# Patient Record
Sex: FEMALE | Employment: UNEMPLOYED | URBAN - METROPOLITAN AREA
[De-identification: names, ages, dates, MRNs, and addresses within clinical notes are randomized per-mention and may not be internally consistent; named-entity substitution may affect disease eponyms.]

---

## 2017-02-20 ENCOUNTER — HOSPITAL ENCOUNTER (OUTPATIENT)
Dept: PHYSICAL THERAPY | Age: 47
Discharge: HOME | End: 2017-02-20
Payer: COMMERCIAL

## 2017-02-20 PROCEDURE — 97161 PT EVAL LOW COMPLEX 20 MIN: CPT

## 2017-02-20 PROCEDURE — 97110 THERAPEUTIC EXERCISES: CPT

## 2017-02-20 PROCEDURE — 97140 MANUAL THERAPY 1/> REGIONS: CPT
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
## 2017-02-21 ENCOUNTER — APPOINTMENT (OUTPATIENT)
Dept: PHYSICAL THERAPY | Age: 47
End: 2017-02-21
Payer: COMMERCIAL

## 2017-02-22 ENCOUNTER — HOSPITAL ENCOUNTER (OUTPATIENT)
Dept: PHYSICAL THERAPY | Age: 47
Discharge: HOME | End: 2017-02-22
Payer: COMMERCIAL

## 2017-02-22 PROCEDURE — 97110 THERAPEUTIC EXERCISES: CPT

## 2017-02-22 NOTE — PROGRESS NOTES
PT DAILY TREATMENT NOTE 8-    Patient Name: Danielle Cabello  Date:2017  : 1970  [x]  Patient  Verified  Payor: Prema Gray / Plan: [facility] / Product Type: PPO /    In time:1:00  Out time:2:00  Total Treatment Time (min): 60  Total Timed Codes (min): 50  1:1 Treatment Time (min):    Visit #: 2 of 12    Treatment Area: Cervicalgia [M54.2]    SUBJECTIVE  Pain Level (0-10 scale): 5-6  Any medication changes, allergies to medications, adverse drug reactions, diagnosis change, or new procedure performed?: [x] No    [] Yes (see summary sheet for update)  Subjective functional status/changes:   [] No changes reported  I'm just a little sore with the exercises she gave me on the first appointment, but I think it's just from use.      OBJECTIVE  Modality rationale: decrease pain and increase tissue extensibility to improve the patients ability to change and maintain head and shoulder positions     Min Type Additional Details    [] Estim: []Att   []Unatt        []TENS instruct                  []IFC  []Premod   []NMES                     []Other:  []w/US   []w/ice   []w/heat  Position:  Location:    []  Traction: [] Cervical       []Lumbar                       [] Prone          []Supine                       []Intermittent   []Continuous Lbs:  [] before manual  [] after manual    []  Ultrasound: []Continuous   [] Pulsed                           []1MHz   []3MHz Location:  W/cm2:    []  Iontophoresis with dexamethasone         Location: [] Take home patch   [] In clinic   10 []  Ice     [x]  heat  []  Ice massage Position:  Location: Lumbar and cervical    []  Vasopneumatic Device Pressure:       [] lo [] med [] hi   Temperature: [] lo [] med [] hi   [] Skin assessment post-treatment:  []intact []redness- no adverse reaction       []redness  adverse reaction:       50 min Therapeutic Exercise:  [] See flow sheet :   Rationale: increase ROM and increase strength to improve the patients ability to change and maintain head and shoulder positions; change of body position          Pain Level (0-10 scale) post treatment: 4    ASSESSMENT/Changes in Function: Today's visit was focused on fundamentals of core stabilization, including bracing contraction. Pt found performance of this challenging due to tendency to hold breath, which should improve with further repetition via addition to HEP. Patient will continue to benefit from skilled PT services to modify and progress therapeutic interventions, address strength deficits and analyze and cue movement patterns to attain remaining goals.      []  See Plan of Care  []  See progress note/recertification  []  See Discharge Summary      PLAN  []  Upgrade activities as tolerated     [x]  Continue plan of care  []  Update interventions per flow sheet       []  Discharge due to:_  []  Other:_      Marlyn Babinski, PT 2/22/2017  2:21 PM

## 2017-02-24 ENCOUNTER — HOSPITAL ENCOUNTER (OUTPATIENT)
Dept: PHYSICAL THERAPY | Age: 47
Discharge: HOME | End: 2017-02-24
Payer: COMMERCIAL

## 2017-02-24 PROCEDURE — 97110 THERAPEUTIC EXERCISES: CPT

## 2017-02-24 NOTE — PROGRESS NOTES
PT DAILY TREATMENT NOTE 8    Patient Name: Jenae Holden  Date:2017  : 1970  [x]  Patient  Verified  Payor: BLUE CROSS / Plan: [employer] [facility] [place] / Product Type: PPO /    In time:1129  Out time:1205  Total Treatment Time (min): 36   Visit #: 3 of 12    Treatment Area: Cervicalgia [M54.2]    SUBJECTIVE  Pain Level (0-10 scale):7  Any medication changes, allergies to medications, adverse drug reactions, diagnosis change, or new procedure performed?: [x] No    [] Yes (see summary sheet for update)  Subjective functional status/changes:   [] No changes reported  \"My neck hurts the most today\"    OBJECTIVE  Modality rationale: decrease pain and increase tissue extensibility to improve the patients ability to change and maintain head and shoulder positions     Min Type Additional Details    [] Estim: []Att   []Unatt        []TENS instruct                  []IFC  []Premod   []NMES                     []Other:  []w/US   []w/ice   []w/heat  Position:  Location:    []  Traction: [] Cervical       []Lumbar                       [] Prone          []Supine                       []Intermittent   []Continuous Lbs:  [] before manual  [] after manual    []  Ultrasound: []Continuous   [] Pulsed                           []1MHz   []3MHz Location:  W/cm2:    []  Iontophoresis with dexamethasone         Location: [] Take home patch   [] In clinic   PD []  Ice     [x]  heat  []  Ice massage Position:  Location: Lumbar and cervical    []  Vasopneumatic Device Pressure:       [] lo [] med [] hi   Temperature: [] lo [] med [] hi   [] Skin assessment post-treatment:  []intact []redness- no adverse reaction       []redness  adverse reaction:       36 min Therapeutic Exercise:  [] See flow sheet :   Rationale: increase ROM and increase strength to improve the patients ability to change and maintain head and shoulder positions; change of body position          Pain Level (0-10 scale) post treatment: 5    ASSESSMENT/Changes in Function: Pt required ample cuing for all therex today with moderate focus on not holding her breath with all TA activities. Pt demonstrated moderate decrease in pain following therex today in the C/S with no reported pain in the L/S today. Pt was educated on possibly holding off on fitness program for 1-2 more weeks in order to focus on postural education and advancing PT. Patient will continue to benefit from skilled PT services to modify and progress therapeutic interventions, address strength deficits and analyze and cue movement patterns to attain remaining goals.      []  See Plan of Care  []  See progress note/recertification  []  See Discharge Summary      PLAN  []  Upgrade activities as tolerated     [x]  Continue plan of care  []  Update interventions per flow sheet       []  Discharge due to:_  []  Other:_      Siomara Raines, PT 2/24/2017  2:21 PM

## 2017-02-28 ENCOUNTER — APPOINTMENT (OUTPATIENT)
Dept: PHYSICAL THERAPY | Age: 47
End: 2017-02-28
Payer: COMMERCIAL

## 2017-03-02 ENCOUNTER — HOSPITAL ENCOUNTER (OUTPATIENT)
Dept: PHYSICAL THERAPY | Age: 47
Discharge: HOME | End: 2017-03-02
Payer: COMMERCIAL

## 2017-03-02 PROCEDURE — 97110 THERAPEUTIC EXERCISES: CPT

## 2017-03-02 PROCEDURE — 97530 THERAPEUTIC ACTIVITIES: CPT

## 2017-03-02 NOTE — PROGRESS NOTES
PT DAILY TREATMENT NOTE 8-    Patient Name: Danielle Cabello  Date:3/2/2017  : 1970  [x]  Patient  Verified  Payor: BLUE CROSS / Plan: [address] [place] [address] [place] / Product Type: PPO /    In time:3:30  Out time:4:22  Total Treatment Time (min): 52  Total Timed Codes (min): 42  1:1 Treatment Time (min):    Visit #: 4 of 12    Treatment Area: Cervicalgia [M54.2]    SUBJECTIVE  Pain Level (0-10 scale): 6  Any medication changes, allergies to medications, adverse drug reactions, diagnosis change, or new procedure performed?: [x] No    [] Yes (see summary sheet for update)  Subjective functional status/changes:   [] No changes reported  I think I must be tensing my neck muscles when I shouldn't be while doing some core exercises.     OBJECTIVE  Modality rationale: decrease pain and increase tissue extensibility to improve the patients ability to change and maintain head and shoulder positions     Min Type Additional Details    [] Estim: []Att   []Unatt        []TENS instruct                  []IFC  []Premod   []NMES                     []Other:  []w/US   []w/ice   []w/heat  Position:  Location:    []  Traction: [] Cervical       []Lumbar                       [] Prone          []Supine                       []Intermittent   []Continuous Lbs:  [] before manual  [] after manual    []  Ultrasound: []Continuous   [] Pulsed                           []1MHz   []3MHz Location:  W/cm2:    []  Iontophoresis with dexamethasone         Location: [] Take home patch   [] In clinic   10 []  Ice     [x]  heat  []  Ice massage Position:  Location: C-spine    []  Vasopneumatic Device Pressure:       [] lo [] med [] hi   Temperature: [] lo [] med [] hi   [] Skin assessment post-treatment:  []intact []redness- no adverse reaction       []redness  adverse reaction:       32 min Therapeutic Exercise:  [] See flow sheet :   Rationale: increase ROM and increase strength to improve the patients ability to change and maintain head and shoulder positions      10 min Therapeutic Activity:  []  See flow sheet :   Rationale: Worked with biofeedback and visual cues to decrease scapular elevation and substitution today to decrease neck pain           Pain Level (0-10 scale) post treatment: 2    ASSESSMENT/Changes in Function: Worked extensively on controlling substitutional neck and shoulder movement with exercises today. Pt was encouraged to add     Patient will continue to benefit from skilled PT services to address strength deficits, analyze and address soft tissue restrictions, analyze and cue movement patterns and assess and modify postural abnormalities to attain remaining goals.      []  See Plan of Care  []  See progress note/recertification  []  See Discharge Summary           PLAN  []  Upgrade activities as tolerated     [x]  Continue plan of care  []  Update interventions per flow sheet       []  Discharge due to:_  []  Other:_      William Alejandre, PT 3/2/2017  3:41 PM

## 2017-03-03 ENCOUNTER — HOSPITAL ENCOUNTER (OUTPATIENT)
Dept: PHYSICAL THERAPY | Age: 47
Discharge: HOME | End: 2017-03-03
Payer: COMMERCIAL

## 2017-03-03 PROCEDURE — 97530 THERAPEUTIC ACTIVITIES: CPT

## 2017-03-03 PROCEDURE — 97110 THERAPEUTIC EXERCISES: CPT

## 2017-03-07 ENCOUNTER — HOSPITAL ENCOUNTER (OUTPATIENT)
Dept: PHYSICAL THERAPY | Age: 47
Discharge: HOME | End: 2017-03-07
Payer: COMMERCIAL

## 2017-03-07 PROCEDURE — 97014 ELECTRIC STIMULATION THERAPY: CPT

## 2017-03-07 PROCEDURE — 97110 THERAPEUTIC EXERCISES: CPT

## 2017-03-08 ENCOUNTER — HOSPITAL ENCOUNTER (OUTPATIENT)
Dept: PHYSICAL THERAPY | Age: 47
Discharge: HOME | End: 2017-03-08
Payer: COMMERCIAL

## 2017-03-08 PROCEDURE — 97110 THERAPEUTIC EXERCISES: CPT

## 2017-03-08 NOTE — PROGRESS NOTES
PT DAILY TREATMENT NOTE     Patient Name: Trixie Aguayo  Date:3/8/2017  : 1970  [x]  Patient  Verified  Payor: BLUE CROSS / Plan: [employer] [facility] / Product Type: PPO /    In time:1:30  Out time:2:27  Total Treatment Time (min): 57  Total Timed Codes (min): 57  1:1 Treatment Time (min):    Visit #: 7 of 12    Treatment Area: Cervicalgia [M54.2]    SUBJECTIVE  Pain Level (0-10 scale): cervical=8/10; lumbar=1/10  Any medication changes, allergies to medications, adverse drug reactions, diagnosis change, or new procedure performed?: [x] No    [] Yes (see summary sheet for update)  Subjective functional status/changes:   [] No changes reported  My neck has been hurting more than my lower back today especially when I look up or down, but it's not bad looking side to side. OBJECTIVE      57 min Therapeutic Exercise:  [x] See flow sheet :   Rationale: increase ROM and increase strength to improve the patients ability to improve functional abilities        min Patient Education: [x] Review HEP    [] Progressed/Changed HEP based on:   [] positioning   [] body mechanics   [] transfers   [] heat/ice application        Other Objective/Functional Measures:     Pain Level (0-10 scale) post treatment: 6/10    ASSESSMENT/Changes in Function: Pt presents with chief c/o increased cervical>lumbar pain/sxs with active cervical flexion and extension. Pt is still on schedule to begin supervised fitness program with certified sports performance coordinators at this facility before next tx. Pt was given updated HEP with theraband issued and declined modalities at end of tx today. Will continue to progress/advance patient within current POC as tolerated with monitoring symptoms.      Patient will continue to benefit from skilled PT services to modify and progress therapeutic interventions, address functional mobility deficits, address ROM deficits, address strength deficits, analyze and cue movement patterns, analyze and modify body mechanics/ergonomics and assess and modify postural abnormalities to attain remaining goals.      []  See Plan of Care  []  See progress note/recertification  []  See Discharge Summary         Progress towards goals / Updated goals:      PLAN  [x]  Upgrade activities as tolerated     []  Continue plan of care  []  Update interventions per flow sheet       []  Discharge due to:_  []  Other:_      Dio eBach PTA 3/8/2017  11:40 AM

## 2017-03-09 ENCOUNTER — APPOINTMENT (OUTPATIENT)
Dept: PHYSICAL THERAPY | Age: 47
End: 2017-03-09
Payer: COMMERCIAL

## 2017-03-10 ENCOUNTER — HOSPITAL ENCOUNTER (OUTPATIENT)
Dept: PHYSICAL THERAPY | Age: 47
Discharge: HOME | End: 2017-03-10
Payer: COMMERCIAL

## 2017-03-10 PROCEDURE — 97110 THERAPEUTIC EXERCISES: CPT

## 2017-03-10 NOTE — PROGRESS NOTES
PT DAILY TREATMENT NOTE 8-    Patient Name: Emi Powers  Date:3/10/2017  : 1970  [x]  Patient  Verified  Payor: BLUE CROSS / Plan: [address] [place] [address] [place] / Product Type: PPO /    In time:1:31  Out time:2:25  Total Treatment Time (min): 56  Total Timed Codes (min): 46  1:1 Treatment Time (min):    Visit #: 8 of 12    Treatment Area: Cervicalgia [M54.2]    SUBJECTIVE  Pain Level (0-10 scale): 2-3  Any medication changes, allergies to medications, adverse drug reactions, diagnosis change, or new procedure performed?: [x] No    [] Yes (see summary sheet for update)  Subjective functional status/changes:   [] No changes reported  Pt reports decreased neck and back soreness from last session.     OBJECTIVE  Modality rationale: decrease pain and increase tissue extensibility to improve the patients ability to change and maintain head and shoulder positions     Min Type Additional Details    [] Estim: []Att   []Unatt        []TENS instruct                  []IFC  []Premod   []NMES                     []Other:  []w/US   []w/ice   []w/heat  Position:  Location:    []  Traction: [] Cervical       []Lumbar                       [] Prone          []Supine                       []Intermittent   []Continuous Lbs:  [] before manual  [] after manual    []  Ultrasound: []Continuous   [] Pulsed                           []1MHz   []3MHz Location:  W/cm2:    []  Iontophoresis with dexamethasone         Location: [] Take home patch   [] In clinic   10 []  Ice     [x]  heat  []  Ice massage Position:  Location: C-spine    []  Vasopneumatic Device Pressure:       [] lo [] med [] hi   Temperature: [] lo [] med [] hi   [] Skin assessment post-treatment:  []intact []redness- no adverse reaction       []redness  adverse reaction:       46 min Therapeutic Exercise:  [] See flow sheet :   Rationale: increase ROM and increase strength to improve the patients ability to change and maintain head and shoulder position    Other Objective/Functional Measures:     Pain Level (0-10 scale) post treatment: 2    ASSESSMENT/Changes in Function: Added deconstructed bird dogs in quadruped for core stability. Pt was unable to control neutral spine with both arm and leg movements simultaneously, so instead performed arm and leg components individually with stick use for tactile/positional cueing. Patient will continue to benefit from skilled PT services to address functional mobility deficits, address strength deficits and analyze and cue movement patterns to attain remaining goals.      []  See Plan of Care  []  See progress note/recertification  []  See Discharge Summary    PLAN  []  Upgrade activities as tolerated     [x]  Continue plan of care  []  Update interventions per flow sheet       []  Discharge due to:_  []  Other:_      Mariela Brown, PT 3/10/2017  12:56 PM

## 2017-03-13 ENCOUNTER — APPOINTMENT (OUTPATIENT)
Dept: PHYSICAL THERAPY | Age: 47
End: 2017-03-13
Payer: COMMERCIAL

## 2017-03-14 ENCOUNTER — APPOINTMENT (OUTPATIENT)
Dept: PHYSICAL THERAPY | Age: 47
End: 2017-03-14
Payer: COMMERCIAL

## 2017-03-14 ENCOUNTER — HOSPITAL ENCOUNTER (OUTPATIENT)
Dept: PHYSICAL THERAPY | Age: 47
Discharge: HOME | End: 2017-03-14
Payer: COMMERCIAL

## 2017-03-14 PROCEDURE — 97110 THERAPEUTIC EXERCISES: CPT

## 2017-03-14 NOTE — PROGRESS NOTES
PT DAILY TREATMENT NOTE       []red  Patient Name: Sravanthi Bui  Date:3/14/2017  : 1970  [x]  Patient  Verified  Payor: Townsend Nelida / Plan: [facility] [place] / Product Type: PPO /    In time:1:33  Out time:2:20  Total Treatment Time (min): 47  Total Timed Codes (min): 47  1:1 Treatment Time (min):    Visit #: 9 of 12    Treatment Area: Cervicalgia [M54.2]    SUBJECTIVE  Pain Level (0-10 scale): 0  Any medication changes, allergies to medications, adverse drug reactions, diagnosis change, or new procedure performed?: [x] No    [] Yes (see summary sheet for update)  Subjective functional status/changes:   [] No changes reported  I really don't have any pain for the first time since I started doing the therapy even with doing the other workout for the first time this morning. OBJECTIVE      47 min Therapeutic Exercise:  [x] See flow sheet :   Rationale: increase ROM and increase strength to improve the patients ability to improve functional abilities         min Patient Education: [x] Review HEP    [] Progressed/Changed HEP based on:   [] positioning   [] body mechanics   [] transfers   [] heat/ice application        Other Objective/Functional Measures:     Pain Level (0-10 scale) post treatment: 0    ASSESSMENT/Changes in Function: Pt was able to advance to level 3 dead bug stabs as well as increasing resistance with theraband postural/scapular and bilateral shoulder ER strengthening exercises with min to mod challenge today. Pt also reports no reoccurrence of pain today even after initial supervised fitness workout program and therapy session. Will continue to progress/advance patient within current POC as tolerated with monitoring symptoms.     Patient will continue to benefit from skilled PT services to modify and progress therapeutic interventions, address functional mobility deficits, address ROM deficits, address strength deficits, analyze and cue movement patterns, analyze and modify body mechanics/ergonomics and assess and modify postural abnormalities to attain remaining goals.      []  See Plan of Care  []  See progress note/recertification  []  See Discharge Summary         Progress towards goals / Updated goals:      PLAN  [x]  Upgrade activities as tolerated     []  Continue plan of care  []  Update interventions per flow sheet       []  Discharge due to:_  []  Other:_      Chava Blevins, PTA 3/14/2017  1:18 PM

## 2017-03-15 ENCOUNTER — HOSPITAL ENCOUNTER (OUTPATIENT)
Dept: PHYSICAL THERAPY | Age: 47
Discharge: HOME | End: 2017-03-15
Payer: COMMERCIAL

## 2017-03-15 PROCEDURE — 97110 THERAPEUTIC EXERCISES: CPT

## 2017-03-15 PROCEDURE — 97112 NEUROMUSCULAR REEDUCATION: CPT

## 2017-03-15 NOTE — PROGRESS NOTES
PT DAILY TREATMENT NOTE 8    Patient Name: Zeny Woodard  Date:3/15/2017  : 1970  [x]  Patient  Verified  Payor: Claudeen Lao / Plan:  [facility] [place] / Product Type: PPO /    In time:11:30  Out time:12:20  Total Treatment Time (min): 50  Total Timed Codes (min): 50  1:1 Treatment Time (min):    Visit #: 10 of 12    Treatment Area: Cervicalgia [M54.2]    SUBJECTIVE  Pain Level (0-10 scale): 0  Any medication changes, allergies to medications, adverse drug reactions, diagnosis change, or new procedure performed?: [x] No    [] Yes (see summary sheet for update)  Subjective functional status/changes:   [] No changes reported  See progress note    OBJECTIVE    41 min Therapeutic Exercise:  [] See flow sheet :   Rationale: increase strength to improve the patients ability to change and maintain body/trunk positions     9 min Neuromuscular Re-education:  []  See flow sheet :   Rationale: improve balance and increase proprioception  to improve the patients ability to control neutral spine and pelvis position- tactile and verbal feedback provided throughout quadruped bird dog progression series    Pain Level (0-10 scale) post treatment: 0    ASSESSMENT/Changes in Function:    []  See Plan of Care  [x]  See progress note/recertification  []  See Discharge Summary           PLAN  []  Upgrade activities as tolerated     [x]  Continue plan of care  []  Update interventions per flow sheet       []  Discharge due to:_  []  Other:_      Unique Richardson, PT 3/15/2017  12:27 PM

## 2017-03-15 NOTE — PROGRESS NOTES
[address] [facility] AT [address], [address] [place] [address] Road  Phone: [phone]   [phone]  PROGRESS NOTE  Patient Name: Danielle Cabello : 1970   Treatment/Medical Diagnosis: Cervicalgia [M54.2]   Referral Source: Dotty Norton MD     Date of Initial Visit: 17 Attended Visits: 10 Missed Visits: 0     SUMMARY OF TREATMENT  Patient education regarding posture/lifting mechanics, neuromuscular re-education, pain modalities PRN, HEP  CURRENT STATUS  Patient reports significant improvement overall following completion of 10 therapy visits for neck and back pain. She reports variable cervical and lumbar pain averaging from 2-5/10, but feels therapy has been helpful and making her stronger for functional activities. She reports less pain and fatigue completing normal home ADL's like cleaning, etc; and she reports fluent and functional ROM with turning her head for driving, etc.   · Short Term Goals: To be accomplished in 3 weeks:  1. Patient will be independent with HEP.- Met  2. Pt will report a pain decrease to 3/10 to improve function. - Met  3. Pt will be able to walk for 15 minutes with no increase in pain. - Met  4. Pt will demonstrate cervical ROM WNL to reach into cabinets. - Met  · Long Term Goals: To be accomplished in 6 weeks:  1. Pt will be independent and compliant with HEP.- Met and ongoing  2. Pt will report an increase of 23 points on her FOTO score to demonstrate improved function. Met, improved from 28/100 to 96/100  3. Pt will demonstrate full strength of BUEs to complete IADLs independently. - Progressing, 4/5 bilateral mid/low traps  4. Pt will be able to walk for 30 minutes with no increase in pain. - Not met, pt reports 2-3 point pain increase  RECOMMENDATIONS  Recommend patient continue therapy 2x/week for up to 6 visits to progress core and postural stabilization, followed by discharge.     If you have any questions/comments please contact us directly at [phone]. Thank you for allowing us to assist in the care of your patient. Therapist Signature: Shabnam Castañeda PT, [facility] Date: 3/15/2017     Time: 12:30 PM   NOTE TO PHYSICIAN:  PLEASE COMPLETE THE ORDERS BELOW AND FAX TO   [facility] at [facility] UNIT: [phone]. If you are unable to process this request in 24 hours please contact our office: [phone].    ___ I have read the above report and request that my patient continue as recommended.   ___ I have read the above report and request that my patient continue therapy with the following changes/special instructions:_________________________________________________________   ___ I have read the above report and request that my patient be discharged from therapy.      Physician Signature:        Date:       Time:

## 2017-03-16 ENCOUNTER — APPOINTMENT (OUTPATIENT)
Dept: PHYSICAL THERAPY | Age: 47
End: 2017-03-16
Payer: COMMERCIAL

## 2017-03-17 ENCOUNTER — HOSPITAL ENCOUNTER (OUTPATIENT)
Dept: PHYSICAL THERAPY | Age: 47
Discharge: HOME | End: 2017-03-17
Payer: COMMERCIAL

## 2017-03-17 PROCEDURE — 97110 THERAPEUTIC EXERCISES: CPT

## 2017-03-17 NOTE — PROGRESS NOTES
PT DAILY TREATMENT NOTE 8-    Patient Name: Gunnar Sweeney  Date:3/17/2017  : 1970  [x]  Patient  Verified  Payor: BLUE CROSS / Plan:  [facility] / Product Type: PPO /    In time:11:20  Out time:12:13  Total Treatment Time (min): 53  Total Timed Codes (min): 53  1:1 Treatment Time (min):    Visit #: 11 of 16    Treatment Area: Cervicalgia [M54.2]    SUBJECTIVE  Pain Level (0-10 scale): 0  Any medication changes, allergies to medications, adverse drug reactions, diagnosis change, or new procedure performed?: [x] No    [] Yes (see summary sheet for update)  Subjective functional status/changes:   [] No changes reported  Pt with no new functional changes to report since previous session. OBJECTIVE    53 min Therapeutic Exercise:  [] See flow sheet :   Rationale: increase ROM, increase strength and improve balance to improve the patients ability to change and maintain body/trunk positions  Included     Other Objective/Functional Measures:     Pain Level (0-10 scale) post treatment: 0    ASSESSMENT/Changes in Function: Patient added static balance activities on unstable surface today to work on core and pelvic stabilization. Pt was challenged with this activity, but denied any increase in back pain with this activity. Patient will continue to benefit from skilled PT services to address functional mobility deficits, address strength deficits and analyze and cue movement patterns to attain remaining goals.      []  See Plan of Care  []  See progress note/recertification  []  See Discharge Summary         PLAN  []  Upgrade activities as tolerated     [x]  Continue plan of care  []  Update interventions per flow sheet       []  Discharge due to:_  []  Other:_      America Mosley PT 3/17/2017  12:54 PM

## 2017-03-20 ENCOUNTER — APPOINTMENT (OUTPATIENT)
Dept: PHYSICAL THERAPY | Age: 47
End: 2017-03-20
Payer: COMMERCIAL

## 2017-03-21 ENCOUNTER — APPOINTMENT (OUTPATIENT)
Dept: PHYSICAL THERAPY | Age: 47
End: 2017-03-21
Payer: COMMERCIAL

## 2017-03-22 ENCOUNTER — HOSPITAL ENCOUNTER (OUTPATIENT)
Dept: PHYSICAL THERAPY | Age: 47
End: 2017-03-22
Payer: COMMERCIAL

## 2017-03-23 ENCOUNTER — APPOINTMENT (OUTPATIENT)
Dept: PHYSICAL THERAPY | Age: 47
End: 2017-03-23
Payer: COMMERCIAL

## 2017-03-24 ENCOUNTER — APPOINTMENT (OUTPATIENT)
Dept: PHYSICAL THERAPY | Age: 47
End: 2017-03-24
Payer: COMMERCIAL

## 2017-03-27 ENCOUNTER — APPOINTMENT (OUTPATIENT)
Dept: PHYSICAL THERAPY | Age: 47
End: 2017-03-27
Payer: COMMERCIAL

## 2017-03-29 ENCOUNTER — APPOINTMENT (OUTPATIENT)
Dept: PHYSICAL THERAPY | Age: 47
End: 2017-03-29
Payer: COMMERCIAL

## 2017-03-31 ENCOUNTER — APPOINTMENT (OUTPATIENT)
Dept: PHYSICAL THERAPY | Age: 47
End: 2017-03-31
Payer: COMMERCIAL

## 2019-04-04 ENCOUNTER — HOSPITAL ENCOUNTER (OUTPATIENT)
Dept: PHYSICAL THERAPY | Age: 49
Discharge: HOME | End: 2019-04-04
Payer: COMMERCIAL

## 2019-04-04 PROCEDURE — 97110 THERAPEUTIC EXERCISES: CPT | Performed by: PHYSICAL THERAPIST

## 2019-04-04 PROCEDURE — 97140 MANUAL THERAPY 1/> REGIONS: CPT | Performed by: PHYSICAL THERAPIST

## 2019-04-04 PROCEDURE — 97162 PT EVAL MOD COMPLEX 30 MIN: CPT | Performed by: PHYSICAL THERAPIST

## 2019-04-04 NOTE — PROGRESS NOTES
PT DAILY TREATMENT NOTE - [facility] 3-16 Patient Name: [facility] Date:2019 : 1970 [x]  Patient  Verified Payor: BLUE CROSS / [place]: [facility] [employer] / Product Type: PPO / In time:132  Out time:215 Total Treatment Time (min): 43 Total Timed Codes (min): 25 
1:1 Treatment Time ( only): 25 Visit #: 1 of 8-12 Treatment Area: Neck pain [M54.2] SUBJECTIVE Pain Level (0-10 scale): 4-5/10 Any medication changes, allergies to medications, adverse drug reactions, diagnosis change, or new procedure performed?: [x] No    [] Yes (see summary sheet for update) Subjective functional status/changes:   [] No changes reported Pt is a 57year old female with subjective complaints of chronic neck pain that started to flare up 6-8 months ago. She had an MRI last month that showed significant compression C5-7 causing numbness and tingling into the L UE. Currently she rates her symptoms a 4-5/10 in the neck and down the the L UE into her hand. Functional limitations include bending over, lifting any weight, and laying down to sleep. She reports that nothing helps her arm symptoms or her neck pain. She reports headaches daily in the back of her head. Negative for red flags. FOTO: 40/100. She is not currently working secondary to the pain. 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 Pt goals for therapy are to be able to go to work and do normal stuff without pain. OBJECTIVE 18 min [x]Eval                  []Re-Eval  
 
 
25 min Therapeutic Exercise:  [] See flow sheet : established/educated HEP Rationale: increase ROM to improve the patients ability to improve headache frequency With 
 [] TE 
 [] TA 
 [] neuro 
 [] other: Patient Education: [x] Review HEP [] Progressed/Changed HEP based on:  
[] positioning   [] body mechanics   [] transfers   [] heat/ice application [] Graston Education: Explained the effects and benefits of Graston Technique therapy including potential for post treatment soreness and bruising. [] Other:   
 
Other Objective/Functional Measures:  
Upon reassessment, pt sits with flexed posture and slight rounded shoulders and forward head. Repeated retractions did not change symptoms in sitting. AROM of the cervical spine is WNLs in all planes however, had increased tightness with cervical flexion/L rotation. B UE screen was negative. Full shoulder ROM and strength B. B  strength: R: 46.8#, L: 42.9#.  Noted muscle restrictions throughout the cervical spine B. Pain Level (0-10 scale) post treatment: 4-5/10 ASSESSMENT/Changes in Function:[x]  See Plan of Care 
[]  See progress note/recertification 
[]  See Discharge Summary Progress towards goals / Updated goals: PER POC PLAN 
[]  Upgrade activities as tolerated     [x]  Continue plan of care 
[]  Update interventions per flow sheet      
[]  Discharge due to:_ 
[x]  Other 2-3x/week for 4 weeks Justification for Eval Code Complexity: 
Patient History : chronicity Examination see exam  
Clinical Presentation: evolving Clinical Decision Making : FOTO : 40 /100 Rochelle Zamorano DPT 4/4/2019  1:35 PM

## 2019-04-04 NOTE — PROGRESS NOTES
Request for use of Dry Needling/Intramuscular Manual Therapy Patient: Layo Sandy     Referral Source: Weston Gómez MD 
Diagnosis: Neck pain [M54.2]      : 1970 Date of initial visit: 19   Attended visits: 1  Missed Visits: 0 Based on findings from the physical therapy examination and evaluation, the evaluating therapist believes the patientLayo  would benefit from including Dry Needling as part of the plan of care. Dry needling is a treatment technique utilized in conjunction with other PT interventions to inactivate myofascial trigger points and the pain and dysfunction they cause. Dry Needling is an advanced procedure that requires additional training including greater than 54 hours of intensive course work. Physical Therapists at [address] [place] [address] are certified through [address] [place] [address] courses for their education and are certified to perform treatments. PROCEDURE: 
? Solid filament sterile needle (typically 0.3mm/30 gauge) inserted into a trigger point ? Repeated movements inactivate the trigger points, taking 30-60 seconds per site ? Typically consists of 1 dry needling session per week and a possible second treatment including muscle re-education, flexibility, strengthening and other manual techniques to facilitate the benefits of dry needling BENEFITS: 
? Inactivation of trigger points ? Decreased pain ? Increased muscle length ? Improved movement patterns ? Restoration of function POTENTIAL RISKS: 
? Post-needling soreness ? Infection ? Bruising/bleeding ? Penetration of a nerve ? Pneumothorax All treating PTs have been thoroughly educated in avoiding adverse reactions If you agree with this recommendation, please sign this form and fax it to us at [phone]. If you have questions or concerns regarding dry needling or any other treatment we may be providing, please contact us at [phone]. Thank you for allowing us to assist in the care of your patient. Britany Garcia DPT    4/4/2019 2:31 PM  
NOTE TO PHYSICIAN:  PLEASE COMPLETE THE ORDERS BELOW AND  
FAX TO [facility]: [phone] If you are unable to process this request in 24 hours please contact our office:  
[phone] ? I have read the above request and AGREE to the recommendation of including dry needling as part of the plan of care. ? I have read the above request and DO NOT AGREE to including dry needling as part of the plan of care. ? I have read the above report and request that my patient continue therapy with the following changes/special instructions: 
________________________________________________________________________ Physicians signature: _______________________________________________ Date: ______________Time:_______________

## 2019-04-08 ENCOUNTER — HOSPITAL ENCOUNTER (OUTPATIENT)
Dept: PHYSICAL THERAPY | Age: 49
End: 2019-04-08
Payer: COMMERCIAL

## 2019-04-10 ENCOUNTER — HOSPITAL ENCOUNTER (OUTPATIENT)
Dept: PHYSICAL THERAPY | Age: 49
Discharge: HOME | End: 2019-04-10
Payer: COMMERCIAL

## 2019-04-10 PROCEDURE — 97110 THERAPEUTIC EXERCISES: CPT | Performed by: PHYSICAL THERAPIST

## 2019-04-10 NOTE — PROGRESS NOTES
PT DAILY TREATMENT NOTE - [facility] 3-16 Patient Name: [facility] Date:4/10/2019 : 1970 [x]  Patient  Verified Payor: BLUE CROSS / [place]: [facility] / Product Type: PPO / In time:1215  Out time:1245 Total Treatment Time (min): 30 Total Timed Codes (min): 30 
1:1 Treatment Time ( only): 30 Visit #: 2 of  Treatment Area: Neck pain [M54.2] SUBJECTIVE Pain Level (0-10 scale): 8/10 Any medication changes, allergies to medications, adverse drug reactions, diagnosis change, or new procedure performed?: [x] No    [] Yes (see summary sheet for update) Subjective functional status/changes:   [] No changes reported Pt reports increased neck pain this session. OBJECTIVE 30 min Therapeutic Exercise:  [] See flow sheet : initiated PT POC Rationale: increase ROM and increase strength to improve the patients ability to improve activity tolerance With 
 [] TE 
 [] TA 
 [] neuro [x] other: Patient Education: [x] Review HEP [] Progressed/Changed HEP based on:  
[] positioning   [] body mechanics   [] transfers   [] heat/ice application [] Graston Education: Explained the effects and benefits of Graston Technique therapy including potential for post treatment soreness and bruising. [x] Other: PT educated on elevated BP and risk of stroke/heart attack Other Objective/Functional Measures:  
Pt initiated PT POC and pt reports increased dizziness with TB rows. Pt sat down and PT took BP: with one cuff her BP was 142/120, switched cuffs and retook BP: 142/105 repeated 1 min later at 160/100. Pt advised to go to ER due to history of mini strokes. Pt denied. Pt was then asked if PT could call her a ride and she denied stating that she was going to go eat and then drive home. Pt advised to call when she returned home. Pain Level (0-10 scale) post treatment: 8/10 ASSESSMENT/Changes in Function: Poor tolerance to PT this session secondary to elevated BP. Pt advised to FU with cardiologist.  
 
Patient will continue to benefit from skilled PT services to modify and progress therapeutic interventions, address functional mobility deficits, address ROM deficits, address strength deficits, analyze and modify body mechanics/ergonomics, assess and modify postural abnormalities and address imbalance/dizziness to attain remaining goals. Progress towards goals / Updated goals: 
1st FU visit, initiated PT POC PLAN 
[]  Upgrade activities as tolerated     [x]  Continue plan of care 
[]  Update interventions per flow sheet      
[]  Discharge due to:_ 
[x]  Other:check goals.   
 
Haley Persaud DPT 4/10/2019  427  PM

## 2019-04-10 NOTE — PROGRESS NOTES
[address] [facility] AT [facility] [address]  Phone [phone]  Fax [phone] PLAN OF CARE / STATEMENT OF MEDICAL NECESSITY FOR PHYSICAL THERAPY SERVICES Patient Name: Deepa Lafleur : 1970 Medical  
Diagnosis: Neck pain [M54.2] Treatment Diagnosis: Neck pain Onset Date: chronic Referral Source: Zaheer High MD Start of Care [facility]): 2019 Prior Hospitalization: See medical history Provider #: [id number] Prior Level of Function:   
Comorbidities: Mini stroke, HBP Medications: Verified on Patient Summary List  
The Plan of Care and following information is based on the information from the initial evaluation.  
================================================================================= Assessment / key information:  Pt is a 57year old female with subjective complaints of chronic neck pain that started to flare up 6-8 months ago. She had an MRI last month that showed significant compression C5-7 causing numbness and tingling into the L UE. Currently she rates her symptoms a 4-5/10 in the neck and down the the L UE into her hand. Functional limitations include bending over, lifting any weight, and laying down to sleep. She reports that nothing helps her arm symptoms or her neck pain. She reports headaches daily in the back of her head. Negative for red flags. FOTO: 40/100. She is not currently working secondary to the pain. 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 Pt goals for therapy are to be able to go to work and do normal stuff without pain. Upon reassessment, pt sits with flexed posture and slight rounded shoulders and forward head. Repeated retractions did not change symptoms in sitting.  AROM of the cervical spine is WNLs in all planes however, had increased tightness with cervical flexion/L rotation. B UE screen was negative. Full shoulder ROM and strength B. B  strength: R: 46.8#, L: 42.9#.  Noted muscle restrictions throughout the cervical spine B. Pt would benefit from a course of ksilled PT to address above deficits to return to PLOF symptom free  
================================================================================= Eval Complexity: History: MEDIUM  Complexity : 1-2 comorbidities / personal factors will impact the outcome/ POC Exam:HIGH Complexity : 4+ Standardized tests and measures addressing body structure, function, activity limitation and / or participation in recreation  Presentation: MEDIUM Complexity : Evolving with changing characteristics  Clinical Decision Making:MEDIUM Complexity : FOTO score of 26-74Overall Complexity:MEDIUM Problem List: pain affecting function, decrease ROM, decrease strength, decrease ADL/ functional abilitiies, decrease activity tolerance and decrease flexibility/ joint mobility Treatment Plan may include any combination of the following: Therapeutic exercise, Therapeutic activities, Neuromuscular re-education, Physical agent/modality, Manual therapy, Patient education and Self Care training Patient / Family readiness to learn indicated by: asking questions and trying to perform skills Persons(s) to be included in education: patient (P) Barriers to Learning/Limitations: None Measures taken:   
Patient Goal (s): Eliminate discomfortness with sitting, sleeping, and activity back to normal  
Patient self reported health status: fair Rehabilitation Potential: good ? Short Term Goals: To be accomplished in  1  weeks: 1. Pt will be IND and compliant with HEP for self-management of symptoms. ? Long Term Goals: To be accomplished in  4  weeks: 1. Pt will improve scpaular strength to 5/5 to improve postural stability with functional mobility.  
2. Pt will report 50% improvement of symptoms to be able to sit for 4 hours to return to work. 3. Pt will be able to lift 10# overhead without pain to put dishes away. 4. Pt will improve FOTO score to at least 49/100 as a functional indicator of improved mobility. Frequency / Duration:   Patient to be seen  2-3  times per week for 4  weeks: 
Patient / Caregiver education and instruction: exercises G-Codes (GP): PRIYANKA Therapist Signature: Maximino Carbone DPT Date: 4/4/2019 Certification Period: NA Time: 12:29 PM  
=========================================================================================== I certify that the above Physical Therapy Services are being furnished while the patient is under my care. I agree with the treatment plan and certify that this therapy is necessary. Physician Signature:       Date:      Time:  Please sign and return to [facility] or you may fax the signed copy to [phone]. Thank you.

## 2019-04-15 ENCOUNTER — HOSPITAL ENCOUNTER (OUTPATIENT)
Dept: PHYSICAL THERAPY | Age: 49
End: 2019-04-15
Payer: COMMERCIAL

## 2019-04-17 ENCOUNTER — APPOINTMENT (OUTPATIENT)
Dept: PHYSICAL THERAPY | Age: 49
End: 2019-04-17
Payer: COMMERCIAL

## 2019-04-22 ENCOUNTER — APPOINTMENT (OUTPATIENT)
Dept: PHYSICAL THERAPY | Age: 49
End: 2019-04-22
Payer: COMMERCIAL

## 2019-04-24 ENCOUNTER — APPOINTMENT (OUTPATIENT)
Dept: PHYSICAL THERAPY | Age: 49
End: 2019-04-24
Payer: COMMERCIAL

## 2019-04-26 NOTE — PROGRESS NOTES
[address] [facility] AT [facility] [address]  Phone [phone]  Fax [phone] DISCHARGE SUMMARY Patient Name: Jerry Mancilla : 1970 Treatment/Medical Diagnosis: Neck pain [M54.2] Referral Source: Dea Alpers, MD    
Date of Initial Visit: 19 Attended Visits: 2 Missed Visits: 1 SUMMARY OF TREATMENT Pt seen for 2 therapy appts for neck pain. Pt had increased BP following 1st FU visit and therapy was ended and it was recommended pt FU with MD. She called on 19 to self- DC per MD recommendations. Reassessment not performed secondary to unplanned DC. RECOMMENDATIONS Other: DC per MD recommendations If you have any questions/comments please contact us directly at [phone]. Thank you for allowing us to assist in the care of your patient. Therapist Signature: Urbano Medrano DPT Date: 19   Time: 10:03 AM

## 2019-04-29 ENCOUNTER — APPOINTMENT (OUTPATIENT)
Dept: PHYSICAL THERAPY | Age: 49
End: 2019-04-29
Payer: COMMERCIAL

## 2020-02-06 ENCOUNTER — IMPORTED ENCOUNTER (OUTPATIENT)
Dept: URBAN - METROPOLITAN AREA CLINIC 1 | Facility: CLINIC | Age: 50
End: 2020-02-06

## 2020-02-06 PROBLEM — H16.143: Noted: 2020-02-06

## 2020-02-06 PROBLEM — H04.123: Noted: 2020-02-06

## 2020-02-06 PROBLEM — H25.813: Noted: 2020-02-06

## 2020-02-06 PROBLEM — H40.023: Noted: 2020-02-06

## 2020-02-06 PROCEDURE — 92004 COMPRE OPH EXAM NEW PT 1/>: CPT

## 2020-02-06 PROCEDURE — 92015 DETERMINE REFRACTIVE STATE: CPT

## 2020-07-08 ENCOUNTER — IMPORTED ENCOUNTER (OUTPATIENT)
Dept: URBAN - METROPOLITAN AREA CLINIC 1 | Facility: CLINIC | Age: 50
End: 2020-07-08

## 2020-07-08 PROBLEM — H16.143: Noted: 2020-07-08

## 2020-07-08 PROBLEM — H04.123: Noted: 2020-07-08

## 2020-07-08 PROCEDURE — 92012 INTRM OPH EXAM EST PATIENT: CPT

## 2020-07-08 NOTE — PATIENT DISCUSSION
1.  NEL w/ PEK OU-The use/continuation of artificial tears were recommended qid can try gel drops hs. Needs to learn to put her drops in herself. 2. Keep appt with PMG in September.

## 2020-09-11 ENCOUNTER — IMPORTED ENCOUNTER (OUTPATIENT)
Dept: URBAN - METROPOLITAN AREA CLINIC 1 | Facility: CLINIC | Age: 50
End: 2020-09-11

## 2020-09-11 PROCEDURE — 92012 INTRM OPH EXAM EST PATIENT: CPT

## 2020-09-11 NOTE — PATIENT DISCUSSION
1.  Transient Visual Obscuration OU -- Does not appear consistent with TIA. Patient has had extensive medical w/u in past including heart catheterization and carotid duplex and is on no meds. Will observe. 2.  NEL w/ PEK OU -- The continuation of artificial tears were recommended. H/o Prior Restasis trial (Patient d/c'd 2nd to burning). 3.  Cataract OU -- Observe for now without intervention. The patient was advised to contact us if any change or worsening of vision4. Glaucoma Suspect OU -- (CD 0.6 OU) IOP stable no gtts OU. Negative family Hx. Patient is considered Low Risk. Condition was discussed with patient and patient understands. Will plan baseline test when patient returns. 5. H/o Gerry Palsy OS. Return for an appointment as scheduled for a 10/OCT with Dr. Olivia Rowley.

## 2020-09-16 PROBLEM — H40.013: Noted: 2020-09-11

## 2020-09-16 PROBLEM — H04.123: Noted: 2020-09-11

## 2020-09-16 PROBLEM — H53.129: Noted: 2020-09-14

## 2020-09-16 PROBLEM — H25.813: Noted: 2020-09-11

## 2020-09-16 PROBLEM — G51.0: Noted: 2020-09-16

## 2020-09-16 PROBLEM — H16.143: Noted: 2020-09-11

## 2020-09-29 ENCOUNTER — IMPORTED ENCOUNTER (OUTPATIENT)
Dept: URBAN - METROPOLITAN AREA CLINIC 1 | Facility: CLINIC | Age: 50
End: 2020-09-29

## 2020-09-29 PROBLEM — H40.013: Noted: 2020-09-29

## 2020-09-29 PROBLEM — H16.143: Noted: 2020-09-29

## 2020-09-29 PROBLEM — H04.123: Noted: 2020-09-29

## 2020-09-29 PROCEDURE — 92133 CPTRZD OPH DX IMG PST SGM ON: CPT

## 2020-09-29 PROCEDURE — 92012 INTRM OPH EXAM EST PATIENT: CPT

## 2020-09-29 NOTE — PATIENT DISCUSSION
1.  Glaucoma Suspect OU (CD 0.60 OU) :  OCT WNL OD minimal thinning OS. Negative family Hx Patient is considered Low Risk. Condition was discussed with patient and patient understands. Will continue to monitor patient for any progression in condition. Patient was advised to call us with any problems questions or concerns. 2.  NEL w/ PEK OU-Recommend PF ATs TID OU routinely. H/o Prior Restasis trial (Patient d/c'd 2nd to burning). 3.  Cataract OU -- Observe for now without intervention. The patient was advised to contact us if any change or worsening of vision4. H/o Transient Visual Obscuration OU -- Does not appear consistent with TIA. Patient has had extensive medical w/u in past including heart catheterization and carotid duplex and is on no meds. Will observe. 5.  H/o [place] Palsy OS. Return for an appointment in 1 year 27 with Dr. Ally Beth.

## 2020-12-02 ENCOUNTER — IMPORTED ENCOUNTER (OUTPATIENT)
Dept: URBAN - METROPOLITAN AREA CLINIC 1 | Facility: CLINIC | Age: 50
End: 2020-12-02

## 2020-12-02 PROBLEM — H04.123: Noted: 2020-12-02

## 2020-12-02 PROBLEM — H57.13: Noted: 2020-12-02

## 2020-12-02 PROCEDURE — 83861 MICROFLUID ANALY TEARS: CPT

## 2020-12-02 PROCEDURE — 92012 INTRM OPH EXAM EST PATIENT: CPT

## 2020-12-02 NOTE — PATIENT DISCUSSION
Dry Eyes OU -The use/continuation of artificial tears were recommended.  H/o failed trial of Restasis

## 2020-12-02 NOTE — PATIENT DISCUSSION
1.  The patient complained of pain in or around both eyes. After complete ophthalmologic examination there was no apparent ocular etiology for the pain likely related to episodes of facial paralysis. 2.  Dry Eyes OU -The use/continuation of artificial tears were recommended. H/o failed trial of Restasis 3. Cataract OU -Observe 4. H/o Transient Visual Obscuration OU5. H/o Bell's Palsy OUReturn for an appointment in September 30 with Dr. Ladan Collins.

## 2021-03-24 ENCOUNTER — IMPORTED ENCOUNTER (OUTPATIENT)
Dept: URBAN - METROPOLITAN AREA CLINIC 1 | Facility: CLINIC | Age: 51
End: 2021-03-24

## 2021-03-24 PROBLEM — H04.123: Noted: 2021-03-24

## 2021-03-24 PROBLEM — H16.143: Noted: 2021-03-24

## 2021-03-24 PROCEDURE — 92015 DETERMINE REFRACTIVE STATE: CPT

## 2021-03-24 PROCEDURE — 92012 INTRM OPH EXAM EST PATIENT: CPT

## 2021-03-24 NOTE — PATIENT DISCUSSION
1.  NEL w/ PEK OU - Recommend ATs QID OU routinely (sample of Systane given). H/o Prior Restasis trial (Patient d/c'd 2nd to burning). Consider Xiidra w/o improvement. Tear lab done 12/2/2020 (OD: 300 OS: 302). 2. Cataract OU - Observe for now without intervention. The patient was advised to contact us if any change or worsening of vision. 3. Glaucoma Suspect OU (CD 0.60 OU) :  IOP 16/17 today. Negative family Hx. Patient is considered Low Risk. Condition was discussed with patient and patient understands. Will continue to monitor patient for any progression in condition. Patient was advised to call us with any problems questions or concerns. 4.  Dermatochalasis UL's OU - Observe with no intervention at this time. 5. H/o The patient complained of pain in or around both eyes. After complete ophthalmologic examination there was no apparent ocular etiology for the pain. 6. H/o Transient Visual Obscuration OU -- Does not appear consistent with TIA. Patient has had extensive medical w/u in past including heart catheterization and carotid duplex and is on no meds. Will observe. 7.  H/o [place] Palsy OS. MRX for glasses given. Return for an appointment in 2 months 10/k check (consider Abebe Mack) with Dr. Omi Ross.

## 2021-03-24 NOTE — PATIENT DISCUSSION
Glaucoma Suspect OU (CD 0.60 OU) :  IOP 16/17 today. Negative family Hx Patient is considered Low Risk. Condition was discussed with patient and patient understands. Will continue to monitor patient for any progression in condition. Patient was advised to call us with any problems questions or concerns. 3.  NEL w/ PEK OU-Recommend ATs QID OU routinely (sample of Systane given). H/o Prior Restasis trial (Patient d/c'd 2nd to burning). 4.  Cataract OU -- Observe for now without intervention. The patient was advised to contact us if any change or worsening of vision5. H/o Transient Visual Obscuration OU -- Does not appear consistent with TIA. Patient has had extensive medical w/u in past including heart catheterization and carotid duplex and is on no meds. Will observe. 6.  H/o [place] Palsy OS.

## 2021-09-23 NOTE — PATIENT DISCUSSION
Recommend Bilateral upper lid blepharoplasty. Medicare (discussed risks and benefits of sx. ..). If pt elects BLL bleph, non-functional BUL fat will be removed at the same time.

## 2021-10-29 NOTE — PROGRESS NOTES
PT DAILY TREATMENT NOTE 8-    Patient Name: Emi Powers  Date:3/3/2017  : 1970  [x]  Patient  Verified  Payor: BLUE CROSS / Plan:  [facility] / Product Type: PPO /    In time:11:30  Out time:12:14  Total Treatment Time (min): 44  Total Timed Codes (min): 44  1:1 Treatment Time (min):    Visit #: 5 of 12    Treatment Area: Cervicalgia [M54.2]    SUBJECTIVE  Pain Level (0-10 scale): 4  Any medication changes, allergies to medications, adverse drug reactions, diagnosis change, or new procedure performed?: [x] No    [] Yes (see summary sheet for update)  Subjective functional status/changes:   [] No changes reported  Pt reports next month she will be moving and going to a 4 week [employer] training program. She hopes to be able to be strong enough for the lifting and carrying, etc.    OBJECTIVE      36 min Therapeutic Exercise:  [] See flow sheet :   Rationale: increase ROM and increase strength to improve the patients ability to change and maintain body/trunk positions    8 min Therapeutic Activity:  []  See flow sheet :   Rationale: Patient education/lifting mechanics with crate x 10 reps to improve movement patterns for lumbar safety     Other Objective/Functional Measures:     Pain Level (0-10 scale) post treatment: 3    ASSESSMENT/Changes in Function: Pt requires mild/mod cueing with lifting technique, which was reviewed with repetitions today using crate. Patient will continue to benefit from skilled PT services to modify and progress therapeutic interventions, address strength deficits and analyze and cue movement patterns to attain remaining goals.      []  See Plan of Care  []  See progress note/recertification  []  See Discharge Summary           PLAN  []  Upgrade activities as tolerated     [x]  Continue plan of care  []  Update interventions per flow sheet       []  Discharge due to:_  []  Other:_      Rome Perry, PT 3/3/2017  11:56 AM CAD (coronary atherosclerotic disease)

## 2021-11-08 NOTE — PROCEDURE NOTE: SURGICAL
"<span style=""font-weight:bold;""></span><span style=""font-weight:bold;"">MR #:&nbsp;</span>&nbsp;[id number]<br /><br /><span style=""font-weight:bold;"">PREOPERATIVE DIAGNOSIS:</span>&nbsp;Dermatochalasis upper lids<br /><br /><span style=""font-weight:bold;"">POSTOPERATIVE DIAGNOSIS:</span>&nbsp;Same<br /><br /><span style=""font-weight:bold;"">OPERATIVE PROCEDURE:</span>&nbsp; Upper lid blepharoplasty both eyes<br /><br /><span style=""font-weight:bold;"">ANESTHESIA: &nbsp;</span>&nbsp;&nbsp;&nbsp; Local MAC<br /><br /><span style=""font-weight:bold;"">ESTIMATED BLOOD LOSS:</span>&nbsp;&nbsp;Minimal

## 2022-01-03 ENCOUNTER — IMPORTED ENCOUNTER (OUTPATIENT)
Dept: URBAN - METROPOLITAN AREA CLINIC 1 | Facility: CLINIC | Age: 52
End: 2022-01-03

## 2022-01-03 PROBLEM — H57.11: Noted: 2022-01-03

## 2022-01-03 PROBLEM — H40.013: Noted: 2022-01-03

## 2022-01-03 PROCEDURE — 92133 CPTRZD OPH DX IMG PST SGM ON: CPT

## 2022-01-03 PROCEDURE — 92012 INTRM OPH EXAM EST PATIENT: CPT

## 2022-01-03 NOTE — PATIENT DISCUSSION
1. 2.  NEL w/ PEK OU -- Recommend ATs QID OU routinely. H/o Prior Restasis trial (Patient d/c'd 2nd to burning). Consider Xiidra w/o improvement. Tear lab done 12/2/2020 (OD: 300 OS: 302). 3. Glaucoma Suspect OU (CD 0.60 OU) -- IOP 17 today. Negative family Hx. Patient is considered Low Risk. Condition was discussed with patient and patient understands. Will continue to monitor patient for any progression in condition. Patient was advised to call us with any problems questions or concerns. 4.  Cataract OU -- Observe for now without intervention. The patient was advised to contact us if any change or worsening of vision. 5. Dermatochalasis UL's OU -- Observe with no intervention at this time. 6. H/o [place] Palsy OS. 7. H/o Transient Visual Obscuration OU -- Does not appear consistent with TIA. Patient has had extensive medical w/u in past including heart catheterization and carotid duplex and is on no meds. Will observe.

## 2022-01-03 NOTE — PATIENT DISCUSSION
1. Pain in/around OD -- After complete ophthalmologic examination there was no apparent ocular etiology for the pain. 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 2.  Glaucoma Suspect OU (CD 0.60 OU) -- No progression by OCT today. IOP 17 today. Negative family Hx. Patient is considered Low Risk. Condition was discussed with patient and patient understands. Will continue to monitor patient for any progression in condition. Patient was advised to call us with any problems questions or concerns. 3.  NEL w/ PEK OU -- Recommend ATs QID OU routinely. H/o Prior Restasis trial (Patient d/c'd due to to burning). Consider Xiidra w/o improvement. Tear lab done 12/2/2020 (OD: 300 OS: 302). 4. Cataract OU -- Observe for now without intervention. The patient was advised to contact us if any change or worsening of vision. 5. Dermatochalasis UL's OU -- Observe with no intervention at this time. 6. H/o [place] Palsy OS. 7. H/o Transient Visual Obscuration OU -- Does not appear consistent with TIA. Patient has had extensive medical w/u in past including heart catheterization and carotid duplex and is on no meds. Will observe. Return for an appointment in 1 year 30/OCT with Dr. Garrett.

## 2022-01-31 ENCOUNTER — IMPORTED ENCOUNTER (OUTPATIENT)
Dept: URBAN - METROPOLITAN AREA CLINIC 1 | Facility: CLINIC | Age: 52
End: 2022-01-31

## 2022-01-31 PROBLEM — H52.4: Noted: 2022-01-31

## 2022-01-31 PROBLEM — H52.13: Noted: 2022-01-31

## 2022-01-31 PROCEDURE — S0621 ROUTINE OPHTHALMOLOGICAL EXA: HCPCS

## 2022-01-31 NOTE — PATIENT DISCUSSION
1. Myopia - Rx was given for correction if indicated and requested. 2. Presbyopia3. Combined Cataracts OU4. NEL w/ PEK OU5. Glaucoma Suspect OU6. Dermatochalasis UL's OU 7. H/o [place] Palsy OS8.  H/o Transient Visual Obscuration OU -- Does not appear consistent with TIA. Patient has had extensive medical w/u in past including heart catheterization and carotid duplex and is on no meds. Will observe. Return for an appointment in 1 year for 40 with Dr. Angela Dodge. Return for an appointment for Return as scheduled with Dr. Angela Dodge.

## 2022-03-01 ENCOUNTER — EMERGENCY VISIT (OUTPATIENT)
Dept: URBAN - METROPOLITAN AREA CLINIC 1 | Facility: CLINIC | Age: 52
End: 2022-03-01

## 2022-03-01 DIAGNOSIS — H16.143: ICD-10-CM

## 2022-03-01 DIAGNOSIS — H04.123: ICD-10-CM

## 2022-03-01 PROCEDURE — 92012 INTRM OPH EXAM EST PATIENT: CPT

## 2022-03-01 ASSESSMENT — VISUAL ACUITY
OS_CC: 20/25
OD_CC: 20/30-1

## 2022-03-01 ASSESSMENT — TONOMETRY
OD_IOP_MMHG: 17
OS_IOP_MMHG: 15

## 2022-03-01 NOTE — PATIENT DISCUSSION
Patient is considered low risk. Condition was discussed with patient and patient understands. Will continue to monitor patient for any progression in condition. Patient was advised to call us with any problems, questions, or concerns.

## 2022-03-01 NOTE — PATIENT DISCUSSION
PEK increased OU today. Increase ATs QID OU (sample of systane given). Begin Alrex BID OU until gone (sample given).

## 2022-03-09 NOTE — PROGRESS NOTES
[facility] AT [address], [address] [place] [address] Road  Phone: [phone]   [phone]  DISCHARGE SUMMARY  Patient Name: Chauncey Lopez : 1970   Treatment/Medical Diagnosis: Cervicalgia [M54.2]   Referral Source: Ishmael De La Cruz MD     Date of Initial Visit: 17 Attended Visits: 11 Missed Visits: 0     SUMMARY OF TREATMENT  Therapeutic exercise for core and postural stabilization, patient education/body mechanics and ergonomics training, modalities PRN, HEP  CURRENT STATUS  Patient attended 11 therapy visits for neck and back pain, and reported significant overall improvement including recent 0/10 pain at rest, up to 2-5/10 LBP at worst. When last seen, patient reported improved strength and endurance with home cleaning ADL's and improved neck mobility with turning head to drive. After last appointment on 3/17/17, the patient requested self discharge and had met the majority of established therapy goals. Most recent progress noted below. · Short Term Goals: To be accomplished in 3 weeks:  1. Patient will be independent with HEP.- Met  2. Pt will report a pain decrease to 3/10 to improve function. - Met  3. Pt will be able to walk for 15 minutes with no increase in pain. - Met  4. Pt will demonstrate cervical ROM WNL to reach into cabinets. - Met  · Long Term Goals: To be accomplished in 6 weeks:  1. Pt will be independent and compliant with HEP.- Met and ongoing  2. Pt will report an increase of 23 points on her FOTO score to demonstrate improved function. Met, improved from 28/100 to 96/100  3. Pt will demonstrate full strength of BUEs to complete IADLs independently. - Progressing, 4/5 bilateral mid/low traps  4. Pt will be able to walk for 30 minutes with no increase in pain. - Not met, pt reports 2-3 point pain increase  RECOMMENDATIONS  Discontinue therapy. Progressing towards or have reached established goals.      If you have any questions/comments please contact us directly at [phone]. Thank you for allowing us to assist in the care of your patient.     Therapist Signature: Maria Luisa Malik PT, [facility] [place] Date: 4/25/17     Time: 9:19 AM yes

## 2022-03-18 ENCOUNTER — EMERGENCY VISIT (OUTPATIENT)
Dept: URBAN - METROPOLITAN AREA CLINIC 1 | Facility: CLINIC | Age: 52
End: 2022-03-18

## 2022-03-18 DIAGNOSIS — H16.143: ICD-10-CM

## 2022-03-18 DIAGNOSIS — H04.123: ICD-10-CM

## 2022-03-18 PROCEDURE — 92012 INTRM OPH EXAM EST PATIENT: CPT

## 2022-03-18 ASSESSMENT — VISUAL ACUITY
OD_CC: 20/25
OS_CC: 20/25

## 2022-03-18 ASSESSMENT — TONOMETRY
OS_IOP_MMHG: 20
OD_IOP_MMHG: 20

## 2022-03-18 NOTE — PATIENT DISCUSSION
Continue increased use of ATs QID OU. D/c Alrex BID OU. Begin Xiidra BID OU (erx'd) H/o of failed trial on Restasis secondary to burning.

## 2022-04-02 ASSESSMENT — VISUAL ACUITY
OS_CC: 20/20-1
OD_SC: 20/25
OD_SC: 20/30
OD_CC: 20/20-1
OD_SC: 20/25
OS_SC: 20/25
OD_CC: 20/40
OS_SC: 20/20
OD_SC: 20/30
OD_CC: J2
OS_SC: 20/20
OS_SC: J5
OS_SC: 20/30
OS_SC: 20/30
OD_SC: J5
OS_SC: 20/20
OD_SC: 20/25
OD_CC: J1+
OS_CC: 20/30
OD_SC: 20/20
OS_CC: J2
OD_SC: 20/20
OS_CC: J1+
OS_SC: 20/25

## 2022-04-02 ASSESSMENT — TONOMETRY
OS_IOP_MMHG: 17
OS_IOP_MMHG: 16
OS_IOP_MMHG: 15
OD_IOP_MMHG: 16
OS_IOP_MMHG: 16
OS_IOP_MMHG: 18
OD_IOP_MMHG: 17
OD_IOP_MMHG: 16
OD_IOP_MMHG: 19
OD_IOP_MMHG: 18
OS_IOP_MMHG: 17
OD_IOP_MMHG: 15
OD_IOP_MMHG: 18
OS_IOP_MMHG: 18
OS_IOP_MMHG: 18

## 2022-05-17 ENCOUNTER — FOLLOW UP (OUTPATIENT)
Dept: URBAN - METROPOLITAN AREA CLINIC 1 | Facility: CLINIC | Age: 52
End: 2022-05-17

## 2022-05-17 DIAGNOSIS — H04.123: ICD-10-CM

## 2022-05-17 DIAGNOSIS — H16.143: ICD-10-CM

## 2022-05-17 PROCEDURE — 92012 INTRM OPH EXAM EST PATIENT: CPT

## 2022-05-17 ASSESSMENT — VISUAL ACUITY
OD_CC: 20/25
OS_CC: 20/25

## 2022-05-17 NOTE — PATIENT DISCUSSION
Continue increased use of ATs QID OU. D/c Alrex BID OU. D/C Xiidra BID OU H/o of failed trial on Restasis secondary to burning.

## 2022-05-17 NOTE — PATIENT DISCUSSION
Continue increased use of ATs QID OU.  Begin Cequa BIDOU (sample given) (erx's)D/C Xiidra BID OU due to pain/discomfort with use.  H/o of failed trial on Restasis secondary to burning. D/c Alrex BID OU.

## 2022-07-22 ENCOUNTER — FOLLOW UP (OUTPATIENT)
Dept: URBAN - METROPOLITAN AREA CLINIC 1 | Facility: CLINIC | Age: 52
End: 2022-07-22

## 2022-07-22 DIAGNOSIS — H16.143: ICD-10-CM

## 2022-07-22 DIAGNOSIS — H04.123: ICD-10-CM

## 2022-07-22 PROCEDURE — 92012 INTRM OPH EXAM EST PATIENT: CPT

## 2022-07-22 ASSESSMENT — VISUAL ACUITY
OD_CC: 20/25
OS_CC: 20/25

## 2022-07-22 NOTE — PATIENT DISCUSSION
Begin Flarex BID OU(Sample given). Continue increased use of ATs QID OU.  Continue Cequa BID OU (sample given) (erx's)D/C Xiidra BID OU due to pain/discomfort with use.  H/o of failed trial on Restasis secondary to burning. D/c Alrex BID OU. Improvement on PEK today, Flarex given to patient to help with possible flare up. If patient needs more Flarex ok to Send in RX for drops.

## 2022-09-13 NOTE — PATIENT DISCUSSION
1.  Cataract OU: Observe for now without intervention. The patient was advised to contact us if any change or worsening of vision2. NEL w/ PEK OU- H/o Prior Restasis Trial (Stopped due to burning) Begin PF ATs TID OU Routinely. (Sample of PF Refresh [place] 3 given). Consider Plugs without improvement. 3.  Glaucoma Suspect OU (CD 0.6 OU) Neg Fm Hx. Risk of cupping. IOP WNL OU today on no gtts; Pt should return for OCT testing. 4.  H/o Bell's Palsy OS Letter to PCP MRx given Return for an appointment in 6 mo 10 OCT with Dr. Divine Hernandes. Mart-1 - Positive Histology Text: MART-1 staining demonstrates areas of higher density and clustering of melanocytes with Pagetoid spread upwards within the epidermis. The surgical margins are positive for tumor cells.

## 2023-01-03 ENCOUNTER — COMPREHENSIVE EXAM (OUTPATIENT)
Dept: URBAN - METROPOLITAN AREA CLINIC 1 | Facility: CLINIC | Age: 53
End: 2023-01-03

## 2023-01-03 DIAGNOSIS — H16.143: ICD-10-CM

## 2023-01-03 DIAGNOSIS — H04.123: ICD-10-CM

## 2023-01-03 DIAGNOSIS — H02.831: ICD-10-CM

## 2023-01-03 DIAGNOSIS — G51.0: ICD-10-CM

## 2023-01-03 DIAGNOSIS — H02.834: ICD-10-CM

## 2023-01-03 DIAGNOSIS — H25.813: ICD-10-CM

## 2023-01-03 DIAGNOSIS — H40.013: ICD-10-CM

## 2023-01-03 PROCEDURE — 92014 COMPRE OPH EXAM EST PT 1/>: CPT

## 2023-01-03 PROCEDURE — 92133 CPTRZD OPH DX IMG PST SGM ON: CPT

## 2023-01-03 ASSESSMENT — TONOMETRY
OD_IOP_MMHG: 16
OS_IOP_MMHG: 15

## 2023-01-03 ASSESSMENT — VISUAL ACUITY
OD_CC: 20/20-1
OS_CC: J5
OS_CC: 20/25-2
OD_CC: J3
OU_CC: J2

## 2023-01-03 NOTE — PATIENT DISCUSSION
No glasses RX given today. Pseudo-exfoliative material OU. Observe for now without intervention. The patient was advised to contact office with any change or worsening of vision.

## 2023-01-03 NOTE — PATIENT DISCUSSION
C/D: 0.6 OU. IOP stable at 16 OD, 15 OS. OCT ON performed today is stable and WNL OU. Patient is considered low risk. Condition was discussed with patient and patient understands. Will continue to monitor patient for any progression in condition. Patient was advised to call us with any problems, questions, or concerns.

## 2023-07-27 ENCOUNTER — COMPREHENSIVE EXAM (OUTPATIENT)
Dept: URBAN - METROPOLITAN AREA CLINIC 1 | Facility: CLINIC | Age: 53
End: 2023-07-27

## 2023-07-27 DIAGNOSIS — H52.13: ICD-10-CM

## 2023-07-27 DIAGNOSIS — Z01.00: ICD-10-CM

## 2023-07-27 DIAGNOSIS — H52.4: ICD-10-CM

## 2023-07-27 PROCEDURE — 92015 DETERMINE REFRACTIVE STATE: CPT

## 2023-07-27 PROCEDURE — 92014 COMPRE OPH EXAM EST PT 1/>: CPT

## 2023-07-27 ASSESSMENT — TONOMETRY
OS_IOP_MMHG: 13
OD_IOP_MMHG: 14

## 2023-07-27 ASSESSMENT — VISUAL ACUITY
OD_BAT: 20/40
OS_SC: J3
OS_BAT: 20/40
OD_SC: J3
OS_SC: 20/40-2
OD_SC: 20/40-2

## 2024-02-19 ENCOUNTER — HOSPITAL ENCOUNTER (OUTPATIENT)
Facility: HOSPITAL | Age: 54
Setting detail: RECURRING SERIES
Discharge: HOME | End: 2024-02-22
Payer: COMMERCIAL

## 2024-02-19 PROCEDURE — 97110 THERAPEUTIC EXERCISES: CPT

## 2024-02-19 PROCEDURE — 97535 SELF CARE MNGMENT TRAINING: CPT

## 2024-02-19 PROCEDURE — 97162 PT EVAL MOD COMPLEX 30 MIN: CPT

## 2024-02-19 NOTE — PROGRESS NOTES
PHYSICAL / OCCUPATIONAL THERAPY - DAILY TREATMENT NOTE (updated )  For Eval visit    Patient Name: Ansley Mccarty    Date: 2024    : 1970  Insurance: Payor: DESTINEE / Plan: [employer] / Product Type: *No Product type* /      Patient  verified yes     Visit #   Current / Total 1 12   Time   In / Out 8:17 9:03   Total Treatment  Time  46   Pain   In / Out 6/10 6/10   Subjective Functional Status/Changes: See below     NEXT PROGRESS NOTE DUE: 2024    TREATMENT AREA =  Right hip pain [M25.551]  Pain in left hip [M25.552]    SUBJECTIVE:  Patient is a pleasant 53 year old female who presents with complaints of bilateral hip pain/leg pain that has been present for most of her life. She admits to feeling off when running/participating in sports growing up due to leg weakness, and states that Right groin pain has recently been worsening due to sitting cross legged on the floor with her two grand kids; when she gets up from the floor her Right hip feels like it is locked. Additionally Patient notes instability in her legs, foot pain, and difficulty negotiating stairs. X-Rays reveal hip dysplagia per Patient report, script reports AMISH bilaterally.     Co-morbidities: back pain, headaches, other disorders, prior surgery    Allergies: Iodinated contrast media, iodine, diphth-acell Pertussis-tetanus, oxycodone-acetaminophen, hydrocodone-acetaminophen, perflutren lipid microspheres     OBJECTIVE    Modalities Rationale:     decrease pain and increase tissue extensibility to improve patient's ability to progress to PLOF and address remaining functional goals.     min [] Estim Unattended, type/location:                                      []  w/ice    []  w/heat    min [] Estim Attended, type/location:                                     []  w/US     []  w/ice    []  w/heat    []  TENS insruct      min []  Mechanical Traction: type/lbs                   []  pro   []  sup   []  int   []  cont    [] 
reduce incidence of LE cramping.  Status at IE Right -53 deg, Left -60 deg  5. Patient will be able to perform SLS for 30 seconds bilaterally without UE support, on even surface, in order to improve dynamic hip stability.  Status at IE 15 seconds bilaterally with significant sway     Frequency / Duration: Patient would benefit from skilled PT 2 to 3 times per week for up to 12 sessions as needed in this certification period.    Patient/ Caregiver education and instruction: Diagnosis, prognosis, exercises [x]  Plan of care has been reviewed with PTA        Jazmin Farrar, PT       2/19/2024       10:07 AM  ===================================================================  I certify that the above Therapy Services are being furnished while the patient is under my care. I agree with the treatment plan and certify that this therapy is necessary.    Physician's Signature:_________________________   DATE:_________   TIME:________                           Chavez Valenzuela, DO    ** Signature, Date and Time must be completed for valid certification **  [facility] or you may fax the signed copy to [phone].  Thank you.

## 2024-02-20 ENCOUNTER — APPOINTMENT (OUTPATIENT)
Facility: HOSPITAL | Age: 54
End: 2024-02-20
Payer: COMMERCIAL

## 2024-02-22 ENCOUNTER — COMPREHENSIVE EXAM (OUTPATIENT)
Dept: URBAN - METROPOLITAN AREA CLINIC 1 | Facility: CLINIC | Age: 54
End: 2024-02-22

## 2024-02-22 ENCOUNTER — HOSPITAL ENCOUNTER (OUTPATIENT)
Facility: HOSPITAL | Age: 54
Setting detail: RECURRING SERIES
Discharge: HOME | End: 2024-02-25
Payer: COMMERCIAL

## 2024-02-22 DIAGNOSIS — H25.813: ICD-10-CM

## 2024-02-22 DIAGNOSIS — H40.013: ICD-10-CM

## 2024-02-22 DIAGNOSIS — H16.143: ICD-10-CM

## 2024-02-22 DIAGNOSIS — H04.123: ICD-10-CM

## 2024-02-22 PROCEDURE — 97112 NEUROMUSCULAR REEDUCATION: CPT

## 2024-02-22 PROCEDURE — 97110 THERAPEUTIC EXERCISES: CPT

## 2024-02-22 PROCEDURE — 92014 COMPRE OPH EXAM EST PT 1/>: CPT

## 2024-02-22 ASSESSMENT — TONOMETRY
OD_IOP_MMHG: 13
OS_IOP_MMHG: 13

## 2024-02-22 ASSESSMENT — VISUAL ACUITY
OD_CC: J1
OD_BAT: 20/40
OD_CC: 20/30-2
OS_CC: J1
OS_CC: 20/25-1
OS_BAT: 20/30

## 2024-02-22 NOTE — PROGRESS NOTES
PHYSICAL / OCCUPATIONAL THERAPY - DAILY TREATMENT NOTE (updated )    Patient Name: Ansley Mccarty    Date: 2024    : 1970  Insurance: Payor: DESTINEE / Plan: [employer]ERAL / Product Type: *No Product type* /      Patient  verified yes     Visit #   Current / Total 2 12   Time   In / Out 7:00 7:45   Total Treatment Time 45   Pain   In / Out 3/10 010   Subjective Functional Status/Changes: They are not really hurting me right now, I mainly feel it when I am on the floor with my grandkids and have to get up, I will feel my hip lock up on me    Changes to:  Meds, Allergies, Med Hx, Sx Hx?  If yes, update Summary List no       TREATMENT AREA =  Right hip pain [M25.551]  Pain in left hip [M25.552]    OBJECTIVE    Modalities Rationale:     decrease edema, decrease inflammation, decrease pain, increase tissue extensibility, and increase muscle contraction/control to improve patient's ability to progress to PLOF and address remaining functional goals.     min [] Estim Unattended, type/location:                                      []  w/ice    []  w/heat    min [] Estim Attended, type/location:                                     []  w/US     []  w/ice    []  w/heat    []  TENS insruct      min []  Mechanical Traction: type/lbs                   []  pro   []  sup   []  int   []  cont    []  before manual    []  after manual    min []  Ultrasound, settings/location:      min []  Iontophoresis w/ dexamethasone, location:                                               []  take home patch       []  in clinic    min  unbilled []  Ice     []  Heat    location/position:     min []  Paraffin,  details:     min []  Vasopneumatic Device, press/temp:     min []  Whirlpool / Fluido:    If using vaso (only need to measure limb vaso being performed on)      pre-treatment girth :       post-treatment girth :       measured at (landmark location) :      min []  Other:    Skin assessment post-treatment (if

## 2024-02-27 ENCOUNTER — APPOINTMENT (OUTPATIENT)
Facility: HOSPITAL | Age: 54
End: 2024-02-27
Payer: COMMERCIAL

## 2024-08-19 ENCOUNTER — COMPREHENSIVE EXAM (OUTPATIENT)
Dept: URBAN - METROPOLITAN AREA CLINIC 1 | Facility: CLINIC | Age: 54
End: 2024-08-19

## 2024-08-19 DIAGNOSIS — H52.13: ICD-10-CM

## 2024-08-19 DIAGNOSIS — H52.223: ICD-10-CM

## 2024-08-19 DIAGNOSIS — H52.4: ICD-10-CM

## 2024-08-19 DIAGNOSIS — Z01.00: ICD-10-CM

## 2024-08-19 PROCEDURE — 92015 DETERMINE REFRACTIVE STATE: CPT

## 2024-08-19 PROCEDURE — 92014 COMPRE OPH EXAM EST PT 1/>: CPT

## 2024-08-19 ASSESSMENT — TONOMETRY
OD_IOP_MMHG: 13
OS_IOP_MMHG: 13

## 2024-08-19 ASSESSMENT — VISUAL ACUITY
OD_CC: J5
OS_CC: 20/25
OD_BAT: 20/40
OD_CC: 20/40-2
OS_BAT: 20/40
OS_CC: J5

## 2024-09-18 ENCOUNTER — HOSPITAL ENCOUNTER (OUTPATIENT)
Facility: HOSPITAL | Age: 54
Setting detail: RECURRING SERIES
Discharge: HOME | End: 2024-09-21
Payer: COMMERCIAL

## 2024-09-18 PROCEDURE — 97535 SELF CARE MNGMENT TRAINING: CPT | Performed by: GENERAL ACUTE CARE HOSPITAL

## 2024-09-18 PROCEDURE — 97110 THERAPEUTIC EXERCISES: CPT | Performed by: GENERAL ACUTE CARE HOSPITAL

## 2024-09-18 PROCEDURE — 97162 PT EVAL MOD COMPLEX 30 MIN: CPT | Performed by: GENERAL ACUTE CARE HOSPITAL

## 2024-09-19 ENCOUNTER — APPOINTMENT (OUTPATIENT)
Facility: HOSPITAL | Age: 54
End: 2024-09-19
Payer: COMMERCIAL

## 2024-09-24 ENCOUNTER — HOSPITAL ENCOUNTER (OUTPATIENT)
Facility: HOSPITAL | Age: 54
Setting detail: RECURRING SERIES
Discharge: HOME | End: 2024-09-27
Payer: COMMERCIAL

## 2024-09-24 PROCEDURE — 97110 THERAPEUTIC EXERCISES: CPT

## 2024-09-24 PROCEDURE — G0283 ELEC STIM OTHER THAN WOUND: HCPCS

## 2024-09-24 PROCEDURE — 97140 MANUAL THERAPY 1/> REGIONS: CPT

## 2024-09-24 PROCEDURE — 97112 NEUROMUSCULAR REEDUCATION: CPT

## 2024-09-26 ENCOUNTER — HOSPITAL ENCOUNTER (OUTPATIENT)
Facility: HOSPITAL | Age: 54
Setting detail: RECURRING SERIES
End: 2024-09-26
Payer: COMMERCIAL

## 2024-09-27 ENCOUNTER — HOSPITAL ENCOUNTER (OUTPATIENT)
Facility: HOSPITAL | Age: 54
Setting detail: RECURRING SERIES
Discharge: HOME | End: 2024-09-30
Payer: COMMERCIAL

## 2024-09-27 PROCEDURE — G0283 ELEC STIM OTHER THAN WOUND: HCPCS

## 2024-09-27 PROCEDURE — 97112 NEUROMUSCULAR REEDUCATION: CPT

## 2024-09-27 PROCEDURE — 97110 THERAPEUTIC EXERCISES: CPT

## 2024-09-27 NOTE — PROGRESS NOTES
PHYSICAL / OCCUPATIONAL THERAPY - DAILY TREATMENT NOTE (updated )    Patient Name: Ansley Mccarty    Date: 2024    : 1970  Insurance: Payor: DESTINEE / Plan: [employer]IPE [employer]ERAL / Product Type: *No Product type* /      Patient  verified yes     Visit #   Current / Total 3 10   Time   In / Out 11:40 12:40   Total Treatment Time 60   Pain   In / Out 6-7 6   Subjective Functional Status/Changes: Pt reports she felt a good bit of relief following the previous/intro session. Her insurance may cover her TENS unit if she gets an Rx from the ortho MD.      NEXT PROGRESS NOTE DUE: 10/18/2024    TREATMENT AREA =  Other low back pain [M54.59]  Cervicalgia [M54.2]    OBJECTIVE    Modalities Rationale:     decrease inflammation and decrease pain to improve patient's ability to progress to PLOF and address remaining functional goals.    10 min [x] Estim Unattended, type/location:  IFC to lumbar paraspinals in supine with B LE elevated                                     [x]  w/ice    []  w/heat    min [] Estim Attended, type/location:                                     []  w/US     []  w/ice    []  w/heat    []  TENS insruct      min []  Mechanical Traction: type/lbs                   []  pro   []  sup   []  int   []  cont    []  before manual    []  after manual    min []  Ultrasound, settings/location:      min []  Iontophoresis w/ dexamethasone, location:                                               []  take home patch       []  in clinic   10 min  unbilled [x]  Ice     []  Heat    location/position: As above     min []  Paraffin,  details:     min []  Vasopneumatic Device, press/temp:     min []  Whirlpool / Fluido:    If using vaso (only need to measure limb vaso being performed on)      pre-treatment girth :       post-treatment girth :       measured at (landmark location) :      min []  Other:    Skin assessment post-treatment (if applicable):    [x]  intact    []  redness- no adverse reaction

## 2024-10-01 ENCOUNTER — APPOINTMENT (OUTPATIENT)
Facility: HOSPITAL | Age: 54
End: 2024-10-01
Payer: COMMERCIAL

## 2024-10-03 ENCOUNTER — APPOINTMENT (OUTPATIENT)
Facility: HOSPITAL | Age: 54
End: 2024-10-03
Payer: COMMERCIAL

## 2024-10-08 ENCOUNTER — HOSPITAL ENCOUNTER (OUTPATIENT)
Facility: HOSPITAL | Age: 54
Setting detail: RECURRING SERIES
End: 2024-10-08
Payer: COMMERCIAL

## 2024-10-10 ENCOUNTER — HOSPITAL ENCOUNTER (OUTPATIENT)
Facility: HOSPITAL | Age: 54
Setting detail: RECURRING SERIES
Discharge: HOME | End: 2024-10-13
Payer: COMMERCIAL

## 2024-10-10 PROCEDURE — G0283 ELEC STIM OTHER THAN WOUND: HCPCS | Performed by: PHYSICAL THERAPIST

## 2024-10-10 PROCEDURE — 97110 THERAPEUTIC EXERCISES: CPT | Performed by: PHYSICAL THERAPIST

## 2024-10-10 PROCEDURE — 97112 NEUROMUSCULAR REEDUCATION: CPT | Performed by: PHYSICAL THERAPIST

## 2024-10-10 NOTE — PROGRESS NOTES
[]redness - adverse reaction:        Therapeutic Procedures:  Tx Min Billable or 1:1 Min (if diff from Tx Min) Procedure, Rationale, Specifics   25  63911 Therapeutic Exercise (timed):  increase ROM, strength, coordination, balance, and proprioception to improve patient's ability to progress to PLOF and address remaining functional goals. (see flow sheet as applicable)     Details if applicable:    Pulleys (flex, scap)  Standing HR/TR  Clamshells  Bridges with TB   15  50096 Neuromuscular Re-Education (timed):  improve balance, coordination, kinesthetic sense, posture, core stability and proprioception to improve patient's ability to develop conscious control of individual muscles and awareness of position of extremities in order to progress to PLOF and address remaining functional goals. (see flow sheet as applicable)     Details if applicable:    PPT with ball/band  PPT with alternating marches  BKFO  Supine scap stabs 1-3     [id number] Therapeutic Activity (timed):  use of dynamic activities replicating functional movements to increase ROM, strength, coordination, balance, and proprioception in order to improve patient's ability to progress to PLOF and address remaining functional goals.  (see flow sheet as applicable)     Details if applicable:       NP  16429 Manual Therapy (timed):  decrease pain, increase ROM, and increase tissue extensibility to improve patient's ability to progress to PLOF and address remaining functional goals.  The manual therapy interventions were performed at a separate and distinct time from the therapeutic activities interventions . (see flow sheet as applicable)     Details if applicable:    Gentle passive stretching of B piriformis and hamstrings within tolerable ranges.    40  [facility] Totals Reminder: bill using total billable min of TIMED therapeutic procedures (example: do not include dry needle or estim unattended, both untimed codes, in totals to left)  8-22 min = 1 unit; 23-37

## 2024-10-11 ENCOUNTER — HOSPITAL ENCOUNTER (OUTPATIENT)
Facility: HOSPITAL | Age: 54
Setting detail: RECURRING SERIES
Discharge: HOME | End: 2024-10-14
Payer: COMMERCIAL

## 2024-10-11 PROCEDURE — 97530 THERAPEUTIC ACTIVITIES: CPT

## 2024-10-11 PROCEDURE — G0283 ELEC STIM OTHER THAN WOUND: HCPCS

## 2024-10-11 PROCEDURE — 97110 THERAPEUTIC EXERCISES: CPT

## 2024-10-11 NOTE — PROGRESS NOTES
vaso being performed on)      pre-treatment girth :       post-treatment girth :       measured at (landmark location) :      min []  Other:    Skin assessment post-treatment (if applicable):    [x]  intact    []  redness- no adverse reaction                 []redness - adverse reaction:        Therapeutic Procedures:  Tx Min Billable or 1:1 Min (if diff from Tx Min) Procedure, Rationale, Specifics   20 10 40084 Therapeutic Exercise (timed):  increase ROM, strength, coordination, balance, and proprioception to improve patient's ability to progress to PLOF and address remaining functional goals. (see flow sheet as applicable)     Details if applicable:    Pulleys (flex, scap)  Cervical rertraction  Incline stretch'  Hamstring stretch   Standing HR/TR  Clamshells  Bridges with TB     66694 Neuromuscular Re-Education (timed):  improve balance, coordination, kinesthetic sense, posture, core stability and proprioception to improve patient's ability to develop conscious control of individual muscles and awareness of position of extremities in order to progress to PLOF and address remaining functional goals. (see flow sheet as applicable)     Details if applicable:    PPT with ball/band  PPT with alternating marches  BKFO  Supine scap stabs 1-3   30 30 66786 Therapeutic Activity (timed):  use of dynamic activities replicating functional movements to increase ROM, strength, coordination, balance, and proprioception in order to improve patient's ability to progress to PLOF and address remaining functional goals.  (see flow sheet as applicable)     Details if applicable:    REASSESSMENT AND PROGRESS REPORT      37152 Manual Therapy (timed):  decrease pain, increase ROM, and increase tissue extensibility to improve patient's ability to progress to PLOF and address remaining functional goals.  The manual therapy interventions were performed at a separate and distinct time from the therapeutic activities interventions . (see 
Duration:   Patient to be seen   2   times per week for   4    weeks:    RECOMMENDATIONS  We would like to continue therapy for progress to goals stated above.  Continue per initial Plan of Care.    If you have any questions/comments please contact us directly.  Thank you for allowing us to assist in the care of your patient.    PTA Signature Cari Escobar PTA     Therapist Signature: Duke Carter DPT  Date: 10/11/2024   Reporting Period: 09/18/2024-10/11/2024 Time: 4:26 PM

## 2024-10-15 ENCOUNTER — HOSPITAL ENCOUNTER (OUTPATIENT)
Facility: HOSPITAL | Age: 54
Setting detail: RECURRING SERIES
Discharge: HOME | End: 2024-10-18
Payer: COMMERCIAL

## 2024-10-15 PROCEDURE — 97110 THERAPEUTIC EXERCISES: CPT

## 2024-10-15 PROCEDURE — 97112 NEUROMUSCULAR REEDUCATION: CPT

## 2024-10-15 NOTE — PROGRESS NOTES
PHYSICAL / OCCUPATIONAL THERAPY - DAILY TREATMENT NOTE (updated )    Patient Name: Ansley Mccarty    Date: 10/15/2024    : 1970  Insurance: Payor: [employer] / Plan: [employer] / Product Type: *No Product type* /      Patient  verified yes     Visit #   Current / Total 1 8   Time   In / Out 4:00 4:55   Total Treatment Time 55   Pain   In / Out -7/10 6/10   Subjective Functional Status/Changes: Pt reports she still feels like she needs to stretch out a good bit. Symptoms are currently ~-7/10.       NEXT PROGRESS NOTE DUE: 2024    TREATMENT AREA =  Other low back pain [M54.59]  Cervicalgia [M54.2]    OBJECTIVE    Modalities Rationale:     decrease inflammation and decrease pain to improve patient's ability to progress to PLOF and address remaining functional goals.    PD min [x] Estim Unattended, type/location:  IFC to lumbar paraspinals in supine with B LE elevated with MH to cervical and lumbar area                                    [x]  w/ice    []  w/heat    min [] Estim Attended, type/location:                                     []  w/US     []  w/ice    []  w/heat    []  TENS insruct      min []  Mechanical Traction: type/lbs                   []  pro   []  sup   []  int   []  cont    []  before manual    []  after manual    min []  Ultrasound, settings/location:      min []  Iontophoresis w/ dexamethasone, location:                                               []  take home patch       []  in clinic    min  unbilled []  Ice     []  Heat    location/position: As above     min []  Paraffin,  details:     min []  Vasopneumatic Device, press/temp:     min []  Whirlpool / Fluido:    If using vaso (only need to measure limb vaso being performed on)      pre-treatment girth :       post-treatment girth :       measured at (landmark location) :      min []  Other:    Skin assessment post-treatment (if applicable):    [x]  intact    []  redness- no adverse reaction

## 2024-10-17 ENCOUNTER — HOSPITAL ENCOUNTER (OUTPATIENT)
Facility: HOSPITAL | Age: 54
Setting detail: RECURRING SERIES
Discharge: HOME | End: 2024-10-20
Payer: COMMERCIAL

## 2024-10-17 PROCEDURE — 97112 NEUROMUSCULAR REEDUCATION: CPT

## 2024-10-17 PROCEDURE — 97110 THERAPEUTIC EXERCISES: CPT

## 2024-10-17 NOTE — PROGRESS NOTES
PHYSICAL / OCCUPATIONAL THERAPY - DAILY TREATMENT NOTE (updated )    Patient Name: Ansley Mccarty    Date: 10/17/2024    : 1970  Insurance: Payor: [employer]INEE / Plan: [employer] / Product Type: *No Product type* /      Patient  verified yes     Visit #   Current / Total 2 8   Time   In / Out 4:40 5:30    Total Treatment Time 50   Pain   In / Out 6-7/10 6/10   Subjective Functional Status/Changes: It's like a dull achy pain in my lower back. I think I need an MRI.     NEXT PROGRESS NOTE DUE: 2024    TREATMENT AREA =  Other low back pain [M54.59]  Cervicalgia [M54.2]    OBJECTIVE    Modalities Rationale:     decrease pain and increase tissue extensibility to improve patient's ability to progress to PLOF and address remaining functional goals.     min [] Estim Unattended, type/location:                                      []  w/ice    []  w/heat    min [] Estim Attended, type/location:                                     []  w/US     []  w/ice    []  w/heat    []  TENS insruct      min []  Mechanical Traction: type/lbs                   []  pro   []  sup   []  int   []  cont    []  before manual    []  after manual    min []  Ultrasound, settings/location:      min []  Iontophoresis w/ dexamethasone, location:                                               []  take home patch       []  in clinic    min  unbilled []  Ice     []  Heat    location/position:     min []  Paraffin,  details:     min []  Vasopneumatic Device, press/temp:     min []  Whirlpool / Fluido:    If using vaso (only need to measure limb vaso being performed on)      pre-treatment girth :       post-treatment girth :       measured at (landmark location) :      min []  Other:    Skin assessment post-treatment (if applicable):    []  intact    []  redness- no adverse reaction                 []redness - adverse reaction:      Vasopnuematic compression justification:  Per bilateral girth measures taken and listed above the

## 2024-10-18 ENCOUNTER — HOSPITAL ENCOUNTER (OUTPATIENT)
Facility: HOSPITAL | Age: 54
Setting detail: RECURRING SERIES
Discharge: HOME | End: 2024-10-21
Payer: COMMERCIAL

## 2024-10-18 PROCEDURE — 97112 NEUROMUSCULAR REEDUCATION: CPT | Performed by: PHYSICAL THERAPIST

## 2024-10-18 PROCEDURE — 97110 THERAPEUTIC EXERCISES: CPT | Performed by: PHYSICAL THERAPIST

## 2024-10-18 NOTE — PROGRESS NOTES
PHYSICAL / OCCUPATIONAL THERAPY - DAILY TREATMENT NOTE (updated )    Patient Name: Ansley Mccarty    Date: 10/18/2024    : 1970  Insurance: Payor: DESTINEE / Plan: [employer] / Product Type: *No Product type* /      Patient  verified yes     Visit #   Current / Total 3 8   Time   In / Out 830 915    Total Treatment Time 45   Pain   In / Out 5-6 5-6   Subjective Functional Status/Changes: It's like a dull achy pain in my lower back. I think I need an MRI.     NEXT PROGRESS NOTE DUE: 2024    TREATMENT AREA =  Other low back pain [M54.59]  Cervicalgia [M54.2]    OBJECTIVE    Modalities Rationale:     decrease pain and increase tissue extensibility to improve patient's ability to progress to PLOF and address remaining functional goals.     min [] Estim Unattended, type/location:                                      []  w/ice    []  w/heat    min [] Estim Attended, type/location:                                     []  w/US     []  w/ice    []  w/heat    []  TENS insruct      min []  Mechanical Traction: type/lbs                   []  pro   []  sup   []  int   []  cont    []  before manual    []  after manual    min []  Ultrasound, settings/location:      min []  Iontophoresis w/ dexamethasone, location:                                               []  take home patch       []  in clinic    min  unbilled []  Ice     []  Heat    location/position:     min []  Paraffin,  details:     min []  Vasopneumatic Device, press/temp:     min []  Whirlpool / Fluido:    If using vaso (only need to measure limb vaso being performed on)      pre-treatment girth :       post-treatment girth :       measured at (landmark location) :      min []  Other:    Skin assessment post-treatment (if applicable):    []  intact    []  redness- no adverse reaction                 []redness - adverse reaction:      Vasopnuematic compression justification:  Per bilateral girth measures taken and listed above the edema is

## 2024-10-22 ENCOUNTER — HOSPITAL ENCOUNTER (OUTPATIENT)
Facility: HOSPITAL | Age: 54
Setting detail: RECURRING SERIES
Discharge: HOME | End: 2024-10-25
Payer: COMMERCIAL

## 2024-10-22 PROCEDURE — 97110 THERAPEUTIC EXERCISES: CPT

## 2024-10-22 PROCEDURE — 97112 NEUROMUSCULAR REEDUCATION: CPT

## 2024-10-22 PROCEDURE — 97530 THERAPEUTIC ACTIVITIES: CPT

## 2024-10-22 NOTE — PROGRESS NOTES
PHYSICAL / OCCUPATIONAL THERAPY - DAILY TREATMENT NOTE (updated )    Patient Name: Ansley Mccarty    Date: 10/22/2024    : 1970  Insurance: Payor: DESTINEE / Plan: [employer]AL / Product Type: *No Product type* /      Patient  verified yes     Visit #   Current / Total 4 8   Time   In / Out 11:00 11:53   Total Treatment Time 53   Pain   In / Out 7-8 6   Subjective Functional Status/Changes: Pt reports her MD is placing an order for an MRI, as she is concnerned about the ongoing pain. She is feeling discomfort in the low back, hips, glutes, and neck today.      NEXT PROGRESS NOTE DUE: 2024    TREATMENT AREA =  Other low back pain [M54.59]  Cervicalgia [M54.2]    OBJECTIVE    Modalities Rationale:     decrease pain and increase tissue extensibility to improve patient's ability to progress to PLOF and address remaining functional goals.    PD min [] Estim Unattended, type/location:                                      []  w/ice    []  w/heat    min [] Estim Attended, type/location:                                     []  w/US     []  w/ice    []  w/heat    []  TENS insruct      min []  Mechanical Traction: type/lbs                   []  pro   []  sup   []  int   []  cont    []  before manual    []  after manual    min []  Ultrasound, settings/location:      min []  Iontophoresis w/ dexamethasone, location:                                               []  take home patch       []  in clinic   PD min  unbilled []  Ice     []  Heat    location/position:     min []  Paraffin,  details:     min []  Vasopneumatic Device, press/temp:     min []  Whirlpool / Fluido:    If using vaso (only need to measure limb vaso being performed on)      pre-treatment girth :       post-treatment girth :       measured at (landmark location) :      min []  Other:    Skin assessment post-treatment (if applicable):    []  intact    []  redness- no adverse reaction                 []redness - adverse reaction:

## 2024-10-24 ENCOUNTER — HOSPITAL ENCOUNTER (OUTPATIENT)
Facility: HOSPITAL | Age: 54
Setting detail: RECURRING SERIES
Discharge: HOME | End: 2024-10-27
Payer: COMMERCIAL

## 2024-10-24 PROCEDURE — 97530 THERAPEUTIC ACTIVITIES: CPT

## 2024-10-24 PROCEDURE — 97112 NEUROMUSCULAR REEDUCATION: CPT

## 2024-10-24 PROCEDURE — 97110 THERAPEUTIC EXERCISES: CPT

## 2024-10-24 NOTE — PROGRESS NOTES
PHYSICAL / OCCUPATIONAL THERAPY - DAILY TREATMENT NOTE (updated )    Patient Name: Ansley Mccarty    Date: 10/24/2024    : 1970  Insurance: Payor: DESTINEE / Plan: [employer]IPE [employer]AL / Product Type: *No Product type* /      Patient  verified yes     Visit #   Current / Total 5 8   Time   In / Out 1:58 2:44   Total Treatment Time 46   Pain   In / Out 7/10 6/10   Subjective Functional Status/Changes: I don't know if it's the exercises making me sore.      NEXT PROGRESS NOTE DUE: 2024    TREATMENT AREA =  Other low back pain [M54.59]  Cervicalgia [M54.2]    OBJECTIVE    Modalities Rationale:     decrease pain and increase tissue extensibility to improve patient's ability to progress to PLOF and address remaining functional goals.     min [] Estim Unattended, type/location:                                      []  w/ice    []  w/heat    min [] Estim Attended, type/location:                                     []  w/US     []  w/ice    []  w/heat    []  TENS insruct      min []  Mechanical Traction: type/lbs                   []  pro   []  sup   []  int   []  cont    []  before manual    []  after manual    min []  Ultrasound, settings/location:      min []  Iontophoresis w/ dexamethasone, location:                                               []  take home patch       []  in clinic    min  unbilled []  Ice     []  Heat    location/position:     min []  Paraffin,  details:     min []  Vasopneumatic Device, press/temp:     min []  Whirlpool / Fluido:    If using vaso (only need to measure limb vaso being performed on)      pre-treatment girth :       post-treatment girth :       measured at (landmark location) :      min []  Other:    Skin assessment post-treatment (if applicable):    []  intact    []  redness- no adverse reaction                 []redness - adverse reaction:      Vasopnuematic compression justification:  Per bilateral girth measures taken and listed above the edema is considered

## 2025-02-24 ENCOUNTER — COMPREHENSIVE EXAM (OUTPATIENT)
Age: 55
End: 2025-02-24

## 2025-02-24 DIAGNOSIS — H40.013: ICD-10-CM

## 2025-02-24 DIAGNOSIS — H16.143: ICD-10-CM

## 2025-02-24 DIAGNOSIS — H25.813: ICD-10-CM

## 2025-02-24 DIAGNOSIS — H02.834: ICD-10-CM

## 2025-02-24 DIAGNOSIS — H02.831: ICD-10-CM

## 2025-02-24 DIAGNOSIS — H04.123: ICD-10-CM

## 2025-02-24 PROCEDURE — 99214 OFFICE O/P EST MOD 30 MIN: CPT

## 2025-08-21 ENCOUNTER — COMPREHENSIVE EXAM (OUTPATIENT)
Age: 55
End: 2025-08-21

## 2025-08-21 DIAGNOSIS — H52.13: ICD-10-CM

## 2025-08-21 DIAGNOSIS — Z01.00: ICD-10-CM

## 2025-08-21 DIAGNOSIS — H52.223: ICD-10-CM

## 2025-08-21 DIAGNOSIS — H52.4: ICD-10-CM

## 2025-08-21 DIAGNOSIS — Z46.0: ICD-10-CM

## 2025-08-21 PROCEDURE — 92015 DETERMINE REFRACTIVE STATE: CPT

## 2025-08-21 PROCEDURE — 92014 COMPRE OPH EXAM EST PT 1/>: CPT

## 2025-08-21 PROCEDURE — 92310-3 LEVEL 3 SOFT LENS UPDATE
